# Patient Record
Sex: FEMALE | Race: BLACK OR AFRICAN AMERICAN | Employment: OTHER
[De-identification: names, ages, dates, MRNs, and addresses within clinical notes are randomized per-mention and may not be internally consistent; named-entity substitution may affect disease eponyms.]

---

## 2017-03-07 ENCOUNTER — OFFICE VISIT (OUTPATIENT)
Dept: FAMILY MEDICINE CLINIC | Facility: CLINIC | Age: 62
End: 2017-03-07

## 2017-03-07 VITALS
HEART RATE: 64 BPM | BODY MASS INDEX: 29.89 KG/M2 | DIASTOLIC BLOOD PRESSURE: 64 MMHG | WEIGHT: 148 LBS | SYSTOLIC BLOOD PRESSURE: 116 MMHG

## 2017-03-07 DIAGNOSIS — S46.002A: ICD-10-CM

## 2017-03-07 DIAGNOSIS — M75.52 DELTOID BURSITIS, LEFT: Primary | ICD-10-CM

## 2017-03-07 PROCEDURE — 99213 OFFICE O/P EST LOW 20 MIN: CPT | Performed by: FAMILY MEDICINE

## 2017-03-07 ASSESSMENT — ENCOUNTER SYMPTOMS
COUGH: 0
SINUS PRESSURE: 0
SHORTNESS OF BREATH: 0
BACK PAIN: 0
NAUSEA: 0
DIARRHEA: 0
VOMITING: 0
SORE THROAT: 0

## 2017-03-27 DIAGNOSIS — M75.52 DELTOID BURSITIS, LEFT: ICD-10-CM

## 2017-03-27 DIAGNOSIS — S46.002A: ICD-10-CM

## 2017-04-04 RX ORDER — ALENDRONATE SODIUM 70 MG/1
TABLET ORAL
Qty: 12 TABLET | Refills: 1 | Status: SHIPPED | OUTPATIENT
Start: 2017-04-04 | End: 2021-05-04 | Stop reason: SDUPTHER

## 2017-07-14 RX ORDER — SERTRALINE HYDROCHLORIDE 100 MG/1
TABLET, FILM COATED ORAL
Qty: 90 TABLET | Refills: 2 | Status: SHIPPED | OUTPATIENT
Start: 2017-07-14 | End: 2019-05-06 | Stop reason: SDUPTHER

## 2017-11-03 ENCOUNTER — OFFICE VISIT (OUTPATIENT)
Dept: FAMILY MEDICINE CLINIC | Age: 62
End: 2017-11-03
Payer: COMMERCIAL

## 2017-11-03 VITALS
HEART RATE: 98 BPM | SYSTOLIC BLOOD PRESSURE: 120 MMHG | DIASTOLIC BLOOD PRESSURE: 70 MMHG | WEIGHT: 151 LBS | HEIGHT: 59 IN | BODY MASS INDEX: 30.44 KG/M2

## 2017-11-03 DIAGNOSIS — R07.9 CHEST PAIN, UNSPECIFIED TYPE: Primary | ICD-10-CM

## 2017-11-03 PROCEDURE — 93000 ELECTROCARDIOGRAM COMPLETE: CPT | Performed by: FAMILY MEDICINE

## 2017-11-03 PROCEDURE — 99213 OFFICE O/P EST LOW 20 MIN: CPT | Performed by: FAMILY MEDICINE

## 2017-11-03 ASSESSMENT — ENCOUNTER SYMPTOMS
SORE THROAT: 0
NAUSEA: 0
DIARRHEA: 0
COUGH: 0
SHORTNESS OF BREATH: 0
BACK PAIN: 0
VOMITING: 0
SINUS PRESSURE: 0

## 2017-11-03 NOTE — PROGRESS NOTES
Ara Harry is a 65 y.o. female who presents today for her medical conditions/complaints as noted below. Ara Harry is c/o of Chest Pain (follow up)  Follow up on episode of chest pain yesterday while at work she went home still had it called Dr on call who suggested heating pad and aleve and this led to complete resolution of the pain and awoke this am pain free. HPI:     Visit Information    Have you changed or started any medications since your last visit including any over-the-counter medicines, vitamins, or herbal medicines? no   Are you having any side effects from any of your medications? -  no  Have you stopped taking any of your medications? Is so, why? -  no    Have you seen any other physician or provider since your last visit? No  Have you had any other diagnostic tests since your last visit? No  Have you been seen in the emergency room and/or had an admission to a hospital since we last saw you? No  Have you had your routine dental cleaning in the past 6 months? yes -     Have you activated your [employer] account? If not, what are your barriers?  No:      Patient Care Team:  Mike Ly MD as PCP - General (Family Medicine)    Medical History Review  Past Medical, Family, and Social History reviewed and  contribute to the patient presenting condition    Health Maintenance   Topic Date Due    HIV screen  01/11/1971    DTaP/Tdap/Td vaccine (1 - Tdap) 01/11/1975    Zostavax vaccine  01/11/2016    Flu vaccine (1) 09/01/2017    Breast cancer screen  09/24/2017    Cervical cancer screen  09/02/2018    Lipid screen  09/06/2019    Colon cancer screen colonoscopy  07/20/2026    Hepatitis C screen  Completed       Past Medical History:   Diagnosis Date    Fracture 1996    Thyroid disease     Dr. Emily Armstrong      Past Surgical History:   Procedure Laterality Date    ANKLE SURGERY      APPENDECTOMY      HYSTERECTOMY  1986     Family History   Problem Relation Age of Onset    Breast

## 2017-12-06 ENCOUNTER — OFFICE VISIT (OUTPATIENT)
Dept: FAMILY MEDICINE CLINIC | Age: 62
End: 2017-12-06
Payer: COMMERCIAL

## 2017-12-06 VITALS
DIASTOLIC BLOOD PRESSURE: 64 MMHG | HEIGHT: 60 IN | SYSTOLIC BLOOD PRESSURE: 120 MMHG | BODY MASS INDEX: 29.84 KG/M2 | HEART RATE: 81 BPM | WEIGHT: 152 LBS

## 2017-12-06 DIAGNOSIS — Z00.00 PHYSICAL EXAM, ANNUAL: ICD-10-CM

## 2017-12-06 DIAGNOSIS — E03.9 HYPOTHYROIDISM, UNSPECIFIED TYPE: Primary | ICD-10-CM

## 2017-12-06 PROCEDURE — 99396 PREV VISIT EST AGE 40-64: CPT | Performed by: FAMILY MEDICINE

## 2017-12-06 ASSESSMENT — ENCOUNTER SYMPTOMS
SINUS PRESSURE: 0
COUGH: 0
NAUSEA: 0
SORE THROAT: 0
VOMITING: 0
SHORTNESS OF BREATH: 0
DIARRHEA: 0
BACK PAIN: 0

## 2017-12-06 ASSESSMENT — PATIENT HEALTH QUESTIONNAIRE - PHQ9
1. LITTLE INTEREST OR PLEASURE IN DOING THINGS: 0
SUM OF ALL RESPONSES TO PHQ9 QUESTIONS 1 & 2: 0
2. FEELING DOWN, DEPRESSED OR HOPELESS: 0
SUM OF ALL RESPONSES TO PHQ QUESTIONS 1-9: 0

## 2017-12-14 DIAGNOSIS — Z00.00 PHYSICAL EXAM, ANNUAL: ICD-10-CM

## 2017-12-14 DIAGNOSIS — E03.9 HYPOTHYROIDISM, UNSPECIFIED TYPE: ICD-10-CM

## 2017-12-14 LAB
ALBUMIN SERPL-MCNC: 3.9 G/DL
ALP BLD-CCNC: 46 U/L
ALT SERPL-CCNC: 9 U/L
ANION GAP SERPL CALCULATED.3IONS-SCNC: 8 MMOL/L
AST SERPL-CCNC: 13 U/L
BASOPHILS ABSOLUTE: 0 /ΜL
BASOPHILS RELATIVE PERCENT: 0.9 %
BILIRUB SERPL-MCNC: 0.7 MG/DL (ref 0.1–1.4)
BUN BLDV-MCNC: 11 MG/DL
CALCIUM SERPL-MCNC: 9.3 MG/DL
CHLORIDE BLD-SCNC: 111 MMOL/L
CHOLESTEROL, TOTAL: 186 MG/DL
CHOLESTEROL/HDL RATIO: NORMAL
CO2: 24 MMOL/L
CREAT SERPL-MCNC: 0.79 MG/DL
EOSINOPHILS ABSOLUTE: 0.1 /ΜL
EOSINOPHILS RELATIVE PERCENT: 1.2 %
GFR CALCULATED: NORMAL
GLUCOSE BLD-MCNC: 92 MG/DL
HCT VFR BLD CALC: 40.6 % (ref 36–46)
HDLC SERPL-MCNC: 64 MG/DL (ref 35–70)
HEMOGLOBIN: 13.1 G/DL (ref 12–16)
LDL CHOLESTEROL CALCULATED: 108 MG/DL (ref 0–160)
LYMPHOCYTES ABSOLUTE: 2.6 /ΜL
LYMPHOCYTES RELATIVE PERCENT: 53.6 %
MCH RBC QN AUTO: 28.4 PG
MCHC RBC AUTO-ENTMCNC: 32.3 G/DL
MCV RBC AUTO: 88 FL
MONOCYTES ABSOLUTE: 0.3 /ΜL
MONOCYTES RELATIVE PERCENT: 6.5 %
NEUTROPHILS ABSOLUTE: 1.8 /ΜL
NEUTROPHILS RELATIVE PERCENT: 37.8 %
PDW BLD-RTO: 13.8 %
PLATELET # BLD: 211 K/ΜL
PMV BLD AUTO: 9 FL
POTASSIUM SERPL-SCNC: 4.5 MMOL/L
RBC # BLD: 4.6 10^6/ΜL
SODIUM BLD-SCNC: 143 MMOL/L
T4 TOTAL: 0.9
TOTAL PROTEIN: 6.8
TRIGL SERPL-MCNC: 72 MG/DL
TSH SERPL DL<=0.05 MIU/L-ACNC: 0.19 UIU/ML
VLDLC SERPL CALC-MCNC: NORMAL MG/DL
WBC # BLD: 4.8 10^3/ML

## 2018-05-18 ENCOUNTER — TELEPHONE (OUTPATIENT)
Dept: FAMILY MEDICINE CLINIC | Age: 63
End: 2018-05-18

## 2018-05-18 ENCOUNTER — OFFICE VISIT (OUTPATIENT)
Dept: FAMILY MEDICINE CLINIC | Age: 63
End: 2018-05-18
Payer: COMMERCIAL

## 2018-05-18 VITALS
SYSTOLIC BLOOD PRESSURE: 122 MMHG | DIASTOLIC BLOOD PRESSURE: 68 MMHG | WEIGHT: 149 LBS | HEART RATE: 80 BPM | BODY MASS INDEX: 29.1 KG/M2

## 2018-05-18 DIAGNOSIS — Z20.5 EXPOSURE TO HEPATITIS A: Primary | ICD-10-CM

## 2018-05-18 PROCEDURE — 99213 OFFICE O/P EST LOW 20 MIN: CPT | Performed by: FAMILY MEDICINE

## 2018-05-18 PROCEDURE — 90471 IMMUNIZATION ADMIN: CPT | Performed by: FAMILY MEDICINE

## 2018-05-18 PROCEDURE — 90632 HEPA VACCINE ADULT IM: CPT | Performed by: FAMILY MEDICINE

## 2018-05-18 ASSESSMENT — ENCOUNTER SYMPTOMS
COUGH: 0
SINUS PRESSURE: 0
VOMITING: 0
SORE THROAT: 0
SHORTNESS OF BREATH: 0
DIARRHEA: 0
NAUSEA: 0
BACK PAIN: 0

## 2018-10-12 RX ORDER — SERTRALINE HYDROCHLORIDE 100 MG/1
100 TABLET, FILM COATED ORAL DAILY
Qty: 90 TABLET | Refills: 3 | Status: SHIPPED | OUTPATIENT
Start: 2018-10-12 | End: 2019-05-06 | Stop reason: SDUPTHER

## 2018-10-12 NOTE — TELEPHONE ENCOUNTER
Health Maintenance   Topic Date Due    HIV screen  01/11/1971    DTaP/Tdap/Td vaccine (1 - Tdap) 01/11/1975    Shingles Vaccine (1 of 2 - 2 Dose Series) 01/11/2006    Breast cancer screen  09/24/2017    Flu vaccine (1) 09/01/2018    Cervical cancer screen  09/02/2018    TSH testing  12/09/2018    Lipid screen  12/09/2022    Colon cancer screen colonoscopy  07/20/2026    Hepatitis C screen  Completed       No results found for: LABA1C          ( goal A1C is < 7)   No results found for: LABMICR  LDL Cholesterol (mg/dL)   Date Value   09/06/2014 105     LDL Calculated (mg/dL)   Date Value   12/09/2017 108       (goal LDL is <100)   AST (U/L)   Date Value   12/09/2017 13     ALT (U/L)   Date Value   12/09/2017 9     BUN (mg/dL)   Date Value   12/09/2017 11     BP Readings from Last 3 Encounters:   05/18/18 122/68   12/06/17 120/64   11/03/17 120/70          (goal 120/80)    All Future Testing planned in [facility]  Lab Frequency Next Occurrence       Next Visit Date:  No future appointments.          Patient Active Problem List:     Osteopenia     FH: breast cancer in first degree relative     Hx of thyroid nodule     Hypothyroidism     Electronic cigarette use     Vaginal dryness, menopausal     Urgency of urination

## 2019-02-22 DIAGNOSIS — Z12.39 BREAST CANCER SCREENING: ICD-10-CM

## 2019-02-22 DIAGNOSIS — Z12.39 BREAST CANCER SCREENING: Primary | ICD-10-CM

## 2019-05-06 RX ORDER — SERTRALINE HYDROCHLORIDE 100 MG/1
TABLET, FILM COATED ORAL
Qty: 90 TABLET | Refills: 3 | Status: SHIPPED | OUTPATIENT
Start: 2019-05-06 | End: 2019-05-10 | Stop reason: SDUPTHER

## 2019-05-06 RX ORDER — SERTRALINE HYDROCHLORIDE 100 MG/1
100 TABLET, FILM COATED ORAL DAILY
Qty: 90 TABLET | Refills: 3 | Status: SHIPPED | OUTPATIENT
Start: 2019-05-06 | End: 2020-05-18

## 2019-05-06 NOTE — TELEPHONE ENCOUNTER
Last visit:   Last Med refill:   Does patient have enough medication for 72 hours:     Next Visit Date:  No future appointments.     Health Maintenance   Topic Date Due    HIV screen  01/11/1971    DTaP/Tdap/Td vaccine (1 - Tdap) 01/11/1975    Shingles Vaccine (1 of 2) 01/11/2006    Cervical cancer screen  09/02/2018    TSH testing  12/09/2018    Breast cancer screen  11/08/2019    Lipid screen  12/09/2022    Colon cancer screen colonoscopy  07/20/2026    Flu vaccine  Completed    Hepatitis C screen  Completed    Pneumococcal 0-64 years Vaccine  Aged Out       No results found for: LABA1C          ( goal A1C is < 7)   No results found for: LABMICR  LDL Cholesterol (mg/dL)   Date Value   09/06/2014 105   10/24/2013 123 (H)     LDL Calculated (mg/dL)   Date Value   12/09/2017 108       (goal LDL is <100)   AST (U/L)   Date Value   12/09/2017 13     ALT (U/L)   Date Value   12/09/2017 9     BUN (mg/dL)   Date Value   12/09/2017 11     BP Readings from Last 3 Encounters:   05/18/18 122/68   12/06/17 120/64   11/03/17 120/70          (goal 120/80)    All Future Testing planned in [facility]  Lab Frequency Next Occurrence               Patient Active Problem List:     Osteopenia     FH: breast cancer in first degree relative     Hx of thyroid nodule     Hypothyroidism     Electronic cigarette use     Vaginal dryness, menopausal     Urgency of urination

## 2019-05-10 RX ORDER — SERTRALINE HYDROCHLORIDE 100 MG/1
TABLET, FILM COATED ORAL
Qty: 90 TABLET | Refills: 3 | Status: SHIPPED | OUTPATIENT
Start: 2019-05-10 | End: 2019-09-09 | Stop reason: SDUPTHER

## 2019-09-09 ENCOUNTER — OFFICE VISIT (OUTPATIENT)
Dept: FAMILY MEDICINE CLINIC | Age: 64
End: 2019-09-09
Payer: COMMERCIAL

## 2019-09-09 VITALS
SYSTOLIC BLOOD PRESSURE: 126 MMHG | TEMPERATURE: 94.4 F | OXYGEN SATURATION: 100 % | WEIGHT: 170.6 LBS | BODY MASS INDEX: 33.32 KG/M2 | HEART RATE: 91 BPM | DIASTOLIC BLOOD PRESSURE: 82 MMHG

## 2019-09-09 DIAGNOSIS — E03.9 HYPOTHYROIDISM, UNSPECIFIED TYPE: ICD-10-CM

## 2019-09-09 DIAGNOSIS — J40 BRONCHITIS: ICD-10-CM

## 2019-09-09 DIAGNOSIS — G89.29 CHRONIC PAIN OF RIGHT KNEE: Primary | ICD-10-CM

## 2019-09-09 DIAGNOSIS — M25.561 CHRONIC PAIN OF RIGHT KNEE: Primary | ICD-10-CM

## 2019-09-09 PROCEDURE — 1036F TOBACCO NON-USER: CPT | Performed by: FAMILY MEDICINE

## 2019-09-09 PROCEDURE — G8417 CALC BMI ABV UP PARAM F/U: HCPCS | Performed by: FAMILY MEDICINE

## 2019-09-09 PROCEDURE — 3017F COLORECTAL CA SCREEN DOC REV: CPT | Performed by: FAMILY MEDICINE

## 2019-09-09 PROCEDURE — G8427 DOCREV CUR MEDS BY ELIG CLIN: HCPCS | Performed by: FAMILY MEDICINE

## 2019-09-09 PROCEDURE — 99213 OFFICE O/P EST LOW 20 MIN: CPT | Performed by: FAMILY MEDICINE

## 2019-09-09 RX ORDER — AZITHROMYCIN 250 MG/1
TABLET, FILM COATED ORAL
Qty: 1 PACKET | Refills: 0 | Status: SHIPPED | OUTPATIENT
Start: 2019-09-09 | End: 2019-10-30 | Stop reason: ALTCHOICE

## 2019-09-09 ASSESSMENT — PATIENT HEALTH QUESTIONNAIRE - PHQ9
1. LITTLE INTEREST OR PLEASURE IN DOING THINGS: 0
SUM OF ALL RESPONSES TO PHQ QUESTIONS 1-9: 0
SUM OF ALL RESPONSES TO PHQ9 QUESTIONS 1 & 2: 0
SUM OF ALL RESPONSES TO PHQ QUESTIONS 1-9: 0
2. FEELING DOWN, DEPRESSED OR HOPELESS: 0

## 2019-09-09 ASSESSMENT — ENCOUNTER SYMPTOMS
VOMITING: 0
NAUSEA: 0
BACK PAIN: 0
SORE THROAT: 0
COUGH: 1
DIARRHEA: 0
SINUS PRESSURE: 0
SHORTNESS OF BREATH: 0

## 2019-09-09 NOTE — PROGRESS NOTES
Mohan Kerr is a 62 y.o. female who presents todayfor her medical conditions/complaints as noted below. Mohan Kerr is here today c/Casimiro Swelling (right for the past few weeks . no injury); Leg Pain (right); Weight Gain; and Cough (X 1 week)      :         HPI    Persistent productive cough. Intermittent right knee pain that at times gives out on her. Other times when she kneels on her right knee very painful shooting pain occurs. Feels at times it swells and possibly associated with some calf and ankle swelling also. Past Medical History:   Diagnosis Date    Fracture     Thyroid disease     Dr. Otoole Degree      Past Surgical History:   Procedure Laterality Date    ANKLE SURGERY      APPENDECTOMY      HYSTERECTOMY       Family History   Problem Relation Age of Onset    Breast Cancer Mother     Heart Attack Mother     Heart Attack Sister     Other Brother         HIV     Social History     Tobacco Use    Smoking status: Former Smoker     Types: Cigarettes     Last attempt to quit: 3/13/2013     Years since quittin.4    Smokeless tobacco: Never Used    Tobacco comment: uses electric cig   Substance Use Topics    Alcohol use: No      Current Outpatient Medications   Medication Sig Dispense Refill    azithromycin (ZITHROMAX Z-JESSI) 250 MG tablet Take 2 pills on day one then one pill daily til gone. 1 packet 0    sertraline (ZOLOFT) 100 MG tablet Take 1 tablet by mouth daily 90 tablet 3    alendronate (FOSAMAX) 70 MG tablet TAKE 1 TABLET BY MOUTH EVERY 7 DAYS 12 tablet 1    levothyroxine (SYNTHROID) 100 MCG tablet Take 100 mcg by mouth Daily. No current facility-administered medications for this visit. No Known Allergies      Subjective:   Review of Systems   Constitutional: Negative for chills, diaphoresis and fever. HENT: Positive for congestion. Negative for sinus pressure and sore throat. Eyes: Negative for visual disturbance. Respiratory: Positive for cough.

## 2019-09-18 DIAGNOSIS — G89.29 CHRONIC PAIN OF RIGHT KNEE: ICD-10-CM

## 2019-09-18 DIAGNOSIS — M25.561 CHRONIC PAIN OF RIGHT KNEE: ICD-10-CM

## 2019-09-25 ENCOUNTER — TELEPHONE (OUTPATIENT)
Dept: FAMILY MEDICINE CLINIC | Age: 64
End: 2019-09-25

## 2019-09-26 DIAGNOSIS — G89.29 CHRONIC PAIN OF RIGHT KNEE: Primary | ICD-10-CM

## 2019-09-26 DIAGNOSIS — M25.561 CHRONIC PAIN OF RIGHT KNEE: Primary | ICD-10-CM

## 2019-10-17 ENCOUNTER — TELEPHONE (OUTPATIENT)
Dept: FAMILY MEDICINE CLINIC | Age: 64
End: 2019-10-17

## 2019-10-30 ENCOUNTER — OFFICE VISIT (OUTPATIENT)
Dept: FAMILY MEDICINE CLINIC | Age: 64
End: 2019-10-30
Payer: COMMERCIAL

## 2019-10-30 VITALS
DIASTOLIC BLOOD PRESSURE: 70 MMHG | HEART RATE: 88 BPM | HEIGHT: 59 IN | OXYGEN SATURATION: 97 % | SYSTOLIC BLOOD PRESSURE: 112 MMHG | WEIGHT: 170.6 LBS | BODY MASS INDEX: 34.39 KG/M2

## 2019-10-30 DIAGNOSIS — R07.89 CHEST WALL PAIN: Primary | ICD-10-CM

## 2019-10-30 DIAGNOSIS — R06.00 DYSPNEA, UNSPECIFIED TYPE: ICD-10-CM

## 2019-10-30 PROCEDURE — 3017F COLORECTAL CA SCREEN DOC REV: CPT | Performed by: FAMILY MEDICINE

## 2019-10-30 PROCEDURE — 99213 OFFICE O/P EST LOW 20 MIN: CPT | Performed by: FAMILY MEDICINE

## 2019-10-30 PROCEDURE — G8482 FLU IMMUNIZE ORDER/ADMIN: HCPCS | Performed by: FAMILY MEDICINE

## 2019-10-30 PROCEDURE — G8417 CALC BMI ABV UP PARAM F/U: HCPCS | Performed by: FAMILY MEDICINE

## 2019-10-30 PROCEDURE — 1036F TOBACCO NON-USER: CPT | Performed by: FAMILY MEDICINE

## 2019-10-30 PROCEDURE — G8427 DOCREV CUR MEDS BY ELIG CLIN: HCPCS | Performed by: FAMILY MEDICINE

## 2019-10-30 ASSESSMENT — ENCOUNTER SYMPTOMS
COUGH: 0
SORE THROAT: 0
VOMITING: 0
DIARRHEA: 0
NAUSEA: 0
SHORTNESS OF BREATH: 0
BACK PAIN: 0
SINUS PRESSURE: 0

## 2019-10-31 ENCOUNTER — HOSPITAL ENCOUNTER (OUTPATIENT)
Age: 64
Setting detail: SPECIMEN
Discharge: HOME | End: 2019-10-31
Payer: COMMERCIAL

## 2019-10-31 DIAGNOSIS — R07.89 CHEST WALL PAIN: ICD-10-CM

## 2019-10-31 DIAGNOSIS — R06.00 DYSPNEA, UNSPECIFIED TYPE: ICD-10-CM

## 2019-10-31 LAB
ABSOLUTE EOS #: 0.06 K/UL (ref 0–0.44)
ABSOLUTE IMMATURE GRANULOCYTE: <0.03 K/UL (ref 0–0.3)
ABSOLUTE LYMPH #: 3.47 K/UL (ref 1.1–3.7)
ABSOLUTE MONO #: 0.38 K/UL (ref 0.1–1.2)
ALBUMIN SERPL-MCNC: 4.2 G/DL (ref 3.5–5.2)
ALBUMIN/GLOBULIN RATIO: 1.2 (ref 1–2.5)
ALP BLD-CCNC: 55 U/L (ref 35–104)
ALT SERPL-CCNC: 11 U/L (ref 5–33)
ANION GAP SERPL CALCULATED.3IONS-SCNC: 12 MMOL/L (ref 9–17)
AST SERPL-CCNC: 16 U/L
BASOPHILS # BLD: 1 % (ref 0–2)
BASOPHILS ABSOLUTE: 0.04 K/UL (ref 0–0.2)
BILIRUB SERPL-MCNC: 0.54 MG/DL (ref 0.3–1.2)
BNP INTERPRETATION: NORMAL
BUN BLDV-MCNC: 13 MG/DL (ref 8–23)
BUN/CREAT BLD: ABNORMAL (ref 9–20)
CALCIUM SERPL-MCNC: 9.5 MG/DL (ref 8.6–10.4)
CHLORIDE BLD-SCNC: 109 MMOL/L (ref 98–107)
CHOLESTEROL/HDL RATIO: 3.2
CHOLESTEROL: 230 MG/DL
CO2: 23 MMOL/L (ref 20–31)
CREAT SERPL-MCNC: 0.76 MG/DL (ref 0.5–0.9)
D-DIMER QUANTITATIVE: 0.63 MG/L FEU
DIFFERENTIAL TYPE: ABNORMAL
EOSINOPHILS RELATIVE PERCENT: 1 % (ref 1–4)
GFR AFRICAN AMERICAN: >60 ML/MIN
GFR NON-AFRICAN AMERICAN: >60 ML/MIN
GFR SERPL CREATININE-BSD FRML MDRD: ABNORMAL ML/MIN/{1.73_M2}
GFR SERPL CREATININE-BSD FRML MDRD: ABNORMAL ML/MIN/{1.73_M2}
GLUCOSE BLD-MCNC: 100 MG/DL (ref 70–99)
HCT VFR BLD CALC: 45.7 % (ref 36.3–47.1)
HDLC SERPL-MCNC: 71 MG/DL
HEMOGLOBIN: 13.9 G/DL (ref 11.9–15.1)
IMMATURE GRANULOCYTES: 0 %
LDL CHOLESTEROL: 136 MG/DL (ref 0–130)
LYMPHOCYTES # BLD: 52 % (ref 24–43)
MCH RBC QN AUTO: 28.3 PG (ref 25.2–33.5)
MCHC RBC AUTO-ENTMCNC: 30.4 G/DL (ref 28.4–34.8)
MCV RBC AUTO: 92.9 FL (ref 82.6–102.9)
MONOCYTES # BLD: 6 % (ref 3–12)
NRBC AUTOMATED: 0 PER 100 WBC
PDW BLD-RTO: 13.8 % (ref 11.8–14.4)
PLATELET # BLD: 262 K/UL (ref 138–453)
PLATELET ESTIMATE: ABNORMAL
PMV BLD AUTO: 11 FL (ref 8.1–13.5)
POTASSIUM SERPL-SCNC: 4.5 MMOL/L (ref 3.7–5.3)
PRO-BNP: 51 PG/ML
RBC # BLD: 4.92 M/UL (ref 3.95–5.11)
RBC # BLD: ABNORMAL 10*6/UL
SEG NEUTROPHILS: 40 % (ref 36–65)
SEGMENTED NEUTROPHILS ABSOLUTE COUNT: 2.6 K/UL (ref 1.5–8.1)
SODIUM BLD-SCNC: 144 MMOL/L (ref 135–144)
TOTAL PROTEIN: 7.6 G/DL (ref 6.4–8.3)
TRIGL SERPL-MCNC: 115 MG/DL
VLDLC SERPL CALC-MCNC: ABNORMAL MG/DL (ref 1–30)
WBC # BLD: 6.6 K/UL (ref 3.5–11.3)
WBC # BLD: ABNORMAL 10*3/UL

## 2019-12-09 ENCOUNTER — TELEPHONE (OUTPATIENT)
Dept: FAMILY MEDICINE CLINIC | Age: 64
End: 2019-12-09

## 2020-05-18 RX ORDER — SERTRALINE HYDROCHLORIDE 100 MG/1
TABLET, FILM COATED ORAL
Qty: 90 TABLET | Refills: 3 | Status: SHIPPED | OUTPATIENT
Start: 2020-05-18 | End: 2021-01-06 | Stop reason: DRUGHIGH

## 2020-05-18 NOTE — TELEPHONE ENCOUNTER
Next Visit Date:  No future appointments.     Health Maintenance   Topic Date Due    HIV screen  01/11/1971    Shingles Vaccine (1 of 2) 01/11/2006    TSH testing  12/09/2018    Breast cancer screen  11/08/2019    Lipid screen  10/31/2024    Colon cancer screen colonoscopy  07/20/2026    DTaP/Tdap/Td vaccine (2 - Td) 03/05/2029    Flu vaccine  Completed    Hepatitis C screen  Completed    Hepatitis A vaccine  Aged Out    Hepatitis B vaccine  Aged Out    Hib vaccine  Aged Out    Meningococcal (ACWY) vaccine  Aged Out    Pneumococcal 0-64 years Vaccine  Aged Out       No results found for: LABA1C          ( goal A1C is < 7)   No results found for: LABMICR  LDL Cholesterol (mg/dL)   Date Value   10/31/2019 136 (H)   09/06/2014 105     LDL Calculated (mg/dL)   Date Value   12/09/2017 108       (goal LDL is <100)   AST (U/L)   Date Value   10/31/2019 16     ALT (U/L)   Date Value   10/31/2019 11     BUN (mg/dL)   Date Value   10/31/2019 13     BP Readings from Last 3 Encounters:   10/30/19 112/70   09/09/19 126/82   05/18/18 122/68          (goal 120/80)    All Future Testing planned in [facility]  Lab Frequency Next Occurrence   XR THORACIC SPINE (2 VIEWS) Once 10/30/2019   XR RIBS RIGHT INCLUDE CHEST (MIN 3 VIEWS) Once 10/30/2019               Patient Active Problem List:     Osteopenia     FH: breast cancer in first degree relative     Hx of thyroid nodule     Hypothyroidism     Electronic cigarette use     Vaginal dryness, menopausal     Urgency of urination

## 2020-10-05 ENCOUNTER — IMMUNIZATION (OUTPATIENT)
Dept: FAMILY MEDICINE CLINIC | Age: 65
End: 2020-10-05
Payer: COMMERCIAL

## 2020-10-05 PROCEDURE — 90471 IMMUNIZATION ADMIN: CPT | Performed by: FAMILY MEDICINE

## 2020-10-05 PROCEDURE — 90694 VACC AIIV4 NO PRSRV 0.5ML IM: CPT | Performed by: FAMILY MEDICINE

## 2020-12-28 ENCOUNTER — TELEPHONE (OUTPATIENT)
Dept: FAMILY MEDICINE CLINIC | Age: 65
End: 2020-12-28

## 2020-12-28 NOTE — TELEPHONE ENCOUNTER
Pt tested last Wednesday the 23rd but the symptoms actually began a week before that. Still need to reschedule?

## 2020-12-28 NOTE — TELEPHONE ENCOUNTER
Please inform her that she has an appointment on 1/6.  If she tested positive yesterday or today she will need to reschedule her appointment to a few days after her 1/6 appointment

## 2021-01-04 ENCOUNTER — TELEPHONE (OUTPATIENT)
Dept: FAMILY MEDICINE CLINIC | Age: 66
End: 2021-01-04

## 2021-01-06 ENCOUNTER — OFFICE VISIT (OUTPATIENT)
Dept: FAMILY MEDICINE CLINIC | Age: 66
End: 2021-01-06
Payer: MEDICARE

## 2021-01-06 ENCOUNTER — HOSPITAL ENCOUNTER (OUTPATIENT)
Age: 66
Setting detail: SPECIMEN
Discharge: HOME | End: 2021-01-06
Payer: MEDICARE

## 2021-01-06 VITALS
HEIGHT: 59 IN | WEIGHT: 162.2 LBS | TEMPERATURE: 96.7 F | OXYGEN SATURATION: 97 % | DIASTOLIC BLOOD PRESSURE: 80 MMHG | HEART RATE: 86 BPM | BODY MASS INDEX: 32.7 KG/M2 | SYSTOLIC BLOOD PRESSURE: 124 MMHG

## 2021-01-06 DIAGNOSIS — F41.9 ANXIETY AND DEPRESSION: ICD-10-CM

## 2021-01-06 DIAGNOSIS — Z76.89 ENCOUNTER TO ESTABLISH CARE: ICD-10-CM

## 2021-01-06 DIAGNOSIS — F32.A ANXIETY AND DEPRESSION: ICD-10-CM

## 2021-01-06 DIAGNOSIS — M85.80 OSTEOPENIA, UNSPECIFIED LOCATION: ICD-10-CM

## 2021-01-06 DIAGNOSIS — E03.9 HYPOTHYROIDISM, UNSPECIFIED TYPE: ICD-10-CM

## 2021-01-06 DIAGNOSIS — E03.9 HYPOTHYROIDISM, UNSPECIFIED TYPE: Primary | ICD-10-CM

## 2021-01-06 LAB
ABSOLUTE EOS #: 0.03 K/UL (ref 0–0.44)
ABSOLUTE IMMATURE GRANULOCYTE: <0.03 K/UL (ref 0–0.3)
ABSOLUTE LYMPH #: 3.5 K/UL (ref 1.1–3.7)
ABSOLUTE MONO #: 0.43 K/UL (ref 0.1–1.2)
ALBUMIN SERPL-MCNC: 4.5 G/DL (ref 3.5–5.2)
ALBUMIN/GLOBULIN RATIO: 1.5 (ref 1–2.5)
ALP BLD-CCNC: 54 U/L (ref 35–104)
ALT SERPL-CCNC: 10 U/L (ref 5–33)
ANION GAP SERPL CALCULATED.3IONS-SCNC: 14 MMOL/L (ref 9–17)
AST SERPL-CCNC: 16 U/L
BASOPHILS # BLD: 1 % (ref 0–2)
BASOPHILS ABSOLUTE: 0.04 K/UL (ref 0–0.2)
BILIRUB SERPL-MCNC: 0.61 MG/DL (ref 0.3–1.2)
BUN BLDV-MCNC: 10 MG/DL (ref 8–23)
BUN/CREAT BLD: NORMAL (ref 9–20)
CALCIUM SERPL-MCNC: 9.7 MG/DL (ref 8.6–10.4)
CHLORIDE BLD-SCNC: 106 MMOL/L (ref 98–107)
CHOLESTEROL, FASTING: 236 MG/DL
CHOLESTEROL/HDL RATIO: 3.5
CO2: 22 MMOL/L (ref 20–31)
CREAT SERPL-MCNC: 0.75 MG/DL (ref 0.5–0.9)
DIFFERENTIAL TYPE: ABNORMAL
EOSINOPHILS RELATIVE PERCENT: 1 % (ref 1–4)
GFR AFRICAN AMERICAN: >60 ML/MIN
GFR NON-AFRICAN AMERICAN: >60 ML/MIN
GFR SERPL CREATININE-BSD FRML MDRD: NORMAL ML/MIN/{1.73_M2}
GFR SERPL CREATININE-BSD FRML MDRD: NORMAL ML/MIN/{1.73_M2}
GLUCOSE BLD-MCNC: 91 MG/DL (ref 70–99)
HCT VFR BLD CALC: 42.7 % (ref 36.3–47.1)
HDLC SERPL-MCNC: 68 MG/DL
HEMOGLOBIN: 13.2 G/DL (ref 11.9–15.1)
IMMATURE GRANULOCYTES: 0 %
LDL CHOLESTEROL: 149 MG/DL (ref 0–130)
LYMPHOCYTES # BLD: 52 % (ref 24–43)
MCH RBC QN AUTO: 28.2 PG (ref 25.2–33.5)
MCHC RBC AUTO-ENTMCNC: 30.9 G/DL (ref 28.4–34.8)
MCV RBC AUTO: 91.2 FL (ref 82.6–102.9)
MONOCYTES # BLD: 7 % (ref 3–12)
NRBC AUTOMATED: 0 PER 100 WBC
PDW BLD-RTO: 13.7 % (ref 11.8–14.4)
PLATELET # BLD: 282 K/UL (ref 138–453)
PLATELET ESTIMATE: ABNORMAL
PMV BLD AUTO: 11.3 FL (ref 8.1–13.5)
POTASSIUM SERPL-SCNC: 4.6 MMOL/L (ref 3.7–5.3)
RBC # BLD: 4.68 M/UL (ref 3.95–5.11)
RBC # BLD: ABNORMAL 10*6/UL
SEG NEUTROPHILS: 39 % (ref 36–65)
SEGMENTED NEUTROPHILS ABSOLUTE COUNT: 2.62 K/UL (ref 1.5–8.1)
SODIUM BLD-SCNC: 142 MMOL/L (ref 135–144)
THYROXINE, FREE: 1.53 NG/DL (ref 0.93–1.7)
TOTAL PROTEIN: 7.5 G/DL (ref 6.4–8.3)
TRIGLYCERIDE, FASTING: 94 MG/DL
TSH SERPL DL<=0.05 MIU/L-ACNC: 0.14 MIU/L (ref 0.3–5)
VLDLC SERPL CALC-MCNC: ABNORMAL MG/DL (ref 1–30)
WBC # BLD: 6.6 K/UL (ref 3.5–11.3)
WBC # BLD: ABNORMAL 10*3/UL

## 2021-01-06 PROCEDURE — G8484 FLU IMMUNIZE NO ADMIN: HCPCS | Performed by: NURSE PRACTITIONER

## 2021-01-06 PROCEDURE — G8427 DOCREV CUR MEDS BY ELIG CLIN: HCPCS | Performed by: NURSE PRACTITIONER

## 2021-01-06 PROCEDURE — 1036F TOBACCO NON-USER: CPT | Performed by: NURSE PRACTITIONER

## 2021-01-06 PROCEDURE — 99214 OFFICE O/P EST MOD 30 MIN: CPT | Performed by: NURSE PRACTITIONER

## 2021-01-06 PROCEDURE — 3017F COLORECTAL CA SCREEN DOC REV: CPT | Performed by: NURSE PRACTITIONER

## 2021-01-06 PROCEDURE — G8417 CALC BMI ABV UP PARAM F/U: HCPCS | Performed by: NURSE PRACTITIONER

## 2021-01-06 SDOH — ECONOMIC STABILITY: INCOME INSECURITY: HOW HARD IS IT FOR YOU TO PAY FOR THE VERY BASICS LIKE FOOD, HOUSING, MEDICAL CARE, AND HEATING?: NOT HARD AT ALL

## 2021-01-06 SDOH — ECONOMIC STABILITY: TRANSPORTATION INSECURITY
IN THE PAST 12 MONTHS, HAS LACK OF TRANSPORTATION KEPT YOU FROM MEETINGS, WORK, OR FROM GETTING THINGS NEEDED FOR DAILY LIVING?: NO

## 2021-01-06 SDOH — ECONOMIC STABILITY: FOOD INSECURITY: WITHIN THE PAST 12 MONTHS, YOU WORRIED THAT YOUR FOOD WOULD RUN OUT BEFORE YOU GOT MONEY TO BUY MORE.: NEVER TRUE

## 2021-01-06 ASSESSMENT — PATIENT HEALTH QUESTIONNAIRE - PHQ9
SUM OF ALL RESPONSES TO PHQ QUESTIONS 1-9: 0
SUM OF ALL RESPONSES TO PHQ QUESTIONS 1-9: 0
1. LITTLE INTEREST OR PLEASURE IN DOING THINGS: 0
SUM OF ALL RESPONSES TO PHQ QUESTIONS 1-9: 0

## 2021-01-06 NOTE — PROGRESS NOTES
[place] [address]  [id number] [facility] [facility]  [address]  [phone]  [phone]    Paolo Craig is a 60 y.o. female who is here with c/o of:    Chief Complaint: New Patient and Establish Care      Patient Accompanied by: N/A    HPI - Paolo Craig is here today with c/o:    Patient here to establish care. Previous PCP: Dr Miesha Leyva  : No  Children: No  Employed: Retired  Exercise: Yes  Diet: Eats a balanced diet  Smoker: No  Alcohol: No  Sleep: Averages 7-8 hours broken up    Chronic Conditions:    Anxiety and depression- Complaint with medication. Denies SI or HI. Hypothyroid- Follows with endo. Last thyroid check last year. She does have enlarged thyroid that they regularly monitor. Osteopenia- Compliant with Fosamax. Dexa utd. Specialists:    Endocrinology- Dr Yudy Moreland [facility]    Health Concerns:    She would like to try weaning off her Zoloft. She started with Zoloft back in  after her mother . She tried to wean off her self and developed brain zaps but was doing it cold turkey.       Health Maintenance Due   Topic Date Due    HIV screen  1971    Shingles Vaccine (1 of 2) 2006    TSH testing  2018    Breast cancer screen  2019        Patient Active Problem List:     Osteopenia     FH: breast cancer in first degree relative     Hx of thyroid nodule     Hypothyroidism     Electronic cigarette use     Vaginal dryness, menopausal     Urgency of urination     Past Medical History:   Diagnosis Date    Fracture     Thyroid disease     Dr. Yudy Moreland      Past Surgical History:   Procedure Laterality Date    ANKLE SURGERY      APPENDECTOMY      HYSTERECTOMY      HYSTERECTOMY, TOTAL ABDOMINAL       Family History   Problem Relation Age of Onset    Breast Cancer Mother     Heart Attack Mother     Heart Attack Sister     Other Brother         HIV     Social History     Tobacco Use    Smoking status: Former Smoker     Types: Cigarettes     Quit date: 3/13/2013     Years since quittin.8    Smokeless tobacco: Never Used    Tobacco comment: uses electric cig   Substance Use Topics    Alcohol use: No     ALLERGIES:  No Known Allergies       Subjective   Review of Systems   · Constitutional:  Negative for activity change, appetite change,unexpected weight change, chills, fever, and fatigue. · HENT: Negative for ear pain, sore throat,  Rhinorrhea, sinus pain, sinus pressure, congestion. · Eyes:  Negative for pain and discharge. · Respiratory:  Negative for chest tightness, shortness of breath, wheezing, and cough. · Cardiovascular:  Negative for chest pain, palpitations and leg swelling. · Gastrointestinal: Negative for abdominal pain, blood in stool, constipation,diarrhea, nausea and vomiting. · Endocrine: Negative for cold intolerance, heat intolerance, polydipsia, polyphagia and polyuria. · Genitourinary: Negative for difficulty urinating, dysuria, flank pain, frequency, hematuria and urgency. · Musculoskeletal: Negative for arthralgias, back pain, joint swelling, myalgias, neck pain and neck stiffness. · Skin: Negative for rash and wound. · Allergic/Immunologic: Negative for environmental allergies and food allergies. · Neurological:  Negative for dizziness, light-headedness, numbness and headaches. · Hematological:  Negative for adenopathy. Does not bruise/bleed easily. · Psychiatric/Behavioral: Negative for self-injury, sleep disturbance and suicidal ideas. Objective   Physical Exam   PHYSICAL EXAM:   · Constitutional: Samantha Black is oriented to person, place, and time. Vital signs are normal. Appears well-developed and well-nourished. She is obese  · HEENT:   · Head: Normocephalic and atraumatic. Nose: Nares normal. Septum midline. No drainage or sinus tenderness. Mucosa pink and moist  · Mouth/Throat: Oropharynx- No erythema, no exudate.   Uvula midline, no erythema, no edema.  Mucous membranes are pink and moist.   · Eyes:PERRL, EOMI, Conjunctiva normal, No discharge. · Neck: Full passive range of motion. Non-tender on palpation. Neck supple. Thyromegaly present. Trachea normal.  · Cardiovascular: Normal rate, regular rhythm, S1, S2, no murmur, no gallop, no friction rub, intact distal pulses. · Pulmonary/Chest: Breath sounds are clear throughout, No respiratory distress, No wheezing, No chest tenderness. Effort normal  · Abdominal: Soft. Normal appearance, bowel sounds are present and normoactive. There is no hepatosplenomegaly. There is no tenderness. There is no CVA tenderness. · Musculoskeletal: Extremities appear regular and symmetric. No evident masses, lesions, foreign bodies, or other abnormalities. No edema. No tenderness on palpation. Joints are stable. Full ROM, strength and tone are within normal limits. · Lymphadenopathy: No lymphadenopathy noted. · Neurological: Alert and oriented to person, place, and time. Normal motor function, Normal sensory function, No focal deficits noted. He has normal strength. · Skin: Skin is warm, dry and intact. No obvious lesions on exposed skin  · Psychiatric: Normal mood and affect. Speech is normal and behavior is normal.     Nursing note and vitals reviewed. Blood pressure 124/80, pulse 86, temperature 96.7 °F (35.9 °C), temperature source Temporal, height 4' 11\" (1.499 m), weight 162 lb 3.2 oz (73.6 kg), SpO2 97 %, not currently breastfeeding. Body mass index is 32.76 kg/m².     Wt Readings from Last 3 Encounters:   01/06/21 162 lb 3.2 oz (73.6 kg)   10/30/19 170 lb 9.6 oz (77.4 kg)   09/09/19 170 lb 9.6 oz (77.4 kg)     BP Readings from Last 3 Encounters:   01/06/21 124/80   10/30/19 112/70   09/09/19 126/82       Hospital Outpatient Visit on 10/31/2019   Component Date Value Ref Range Status    D-Dimer, Quant 10/31/2019 0.63  mg/L FEU Final    Comment:        When combined with a low clinical probability, a D dimer value of <0.50 mg/L FEU is   considered negative for DVT and PE (negative predictive value of 98%, sensitivity of 97%). If this test is not being used to help rule out DVT and PE, then the following reference   range should be utilized: 0.00 - 1.02 mg/L FEU. The [employer] D-Dimer assay is intended for use as an aid in the diagnosis of venous   thromboembolism (DVT and PE) and the results should be interpreted in conjunction with the   patient's medical history, clinical presentation, and other findings. Elevated levels of D-dimer activity can be seen in any state of coagulation activation and   is not recommended in patients with therapeutic dose anticoagulant therapy for >24 hours,   fibrinolytic therapy within the previous 7 days, trauma or surgery within the previous 4   weeks,   disseminated malignancies, aortic aneurysm, sepsis, severe infections, pneumonia, severe   skin infections, liver cirrhosis, advance                           d age, coronary disease, diabetes, and pregnancy. A very low percentage of patients with DVT may yield D-dimer results below the cutoff of   0.5 mg/L FEU. This is known to be more prevalent in patients with distal DVT.  Pro-BNP 10/31/2019 51  <300 pg/mL Final     Pro-BNP results cannot be compared to BNP results.  BNP Interpretation 10/31/2019 Pro-BNP Reference Range:   Final    Comment:       Rule Out:    <300        Grey Zone:   Age <50     300-450   Age 54-65   300-900   Age >75     300-1800  Usually represents mild to moderate HF but other cardiopulmonary causes cannot be ruled out.         Rule In:   Age <50     >65   Age 54-65   >900   Age >76    >5      Cholesterol 10/31/2019 230* <200 mg/dL Final    Comment:    Cholesterol Guidelines:      <200  Desirable   200-240  Borderline      >240  Undesirable         HDL 10/31/2019 71  >40 mg/dL Final    Comment:    HDL Guidelines:    <40     Undesirable   40-59    Borderline    >59 Desirable         LDL Cholesterol 10/31/2019 136* 0 - 130 mg/dL Final    Comment:    LDL Guidelines:     <100    Desirable   100-129   Near to/above Desirable   130-159   Borderline      >159   Undesirable     Direct (measured) LDL and calculated LDL are not interchangeable tests.  Chol/HDL Ratio 10/31/2019 3.2  <5 Final            Triglycerides 10/31/2019 115  <150 mg/dL Final    Comment:    Triglyceride Guidelines:     <150   Desirable   150-199  Borderline   200-499  High     >499   Very high   Based on AHA Guidelines for fasting triglyceride, October 2012.  VLDL 10/31/2019 NOT REPORTED  1 - 30 mg/dL Final    Glucose 10/31/2019 100* 70 - 99 mg/dL Final    BUN 10/31/2019 13  8 - 23 mg/dL Final    CREATININE 10/31/2019 0.76  0.50 - 0.90 mg/dL Final    Bun/Cre Ratio 10/31/2019 NOT REPORTED  9 - 20 Final    Calcium 10/31/2019 9.5  8.6 - 10.4 mg/dL Final    Sodium 10/31/2019 144  135 - 144 mmol/L Final    Potassium 10/31/2019 4.5  3.7 - 5.3 mmol/L Final    Chloride 10/31/2019 109* 98 - 107 mmol/L Final    CO2 10/31/2019 23  20 - 31 mmol/L Final    Anion Gap 10/31/2019 12  9 - 17 mmol/L Final    Alkaline Phosphatase 10/31/2019 55  35 - 104 U/L Final    ALT 10/31/2019 11  5 - 33 U/L Final    AST 10/31/2019 16  <32 U/L Final    Total Bilirubin 10/31/2019 0.54  0.3 - 1.2 mg/dL Final    Total Protein 10/31/2019 7.6  6.4 - 8.3 g/dL Final    Alb 10/31/2019 4.2  3.5 - 5.2 g/dL Final    Albumin/Globulin Ratio 10/31/2019 1.2  1.0 - 2.5 Final    GFR Non- 10/31/2019 >60  >60 mL/min Final    GFR  10/31/2019 >60  >60 mL/min Final    GFR Comment 10/31/2019        Final    Comment: Average GFR for 61-76 years old:   80 mL/min/1.73sq m  Chronic Kidney Disease:   <60 mL/min/1.73sq m  Kidney failure:   <15 mL/min/1.73sq m              eGFR calculated using average adult body mass.  Additional eGFR calculator available at: Comprehensive Metabolic Panel; Future  - TSH with Reflex; Future  - Lipid, Fasting; Future    2. Anxiety and depression    - Will decrease the dose to 50 mg until next appointment and see how she tolerates it. - sertraline (ZOLOFT) 50 MG tablet; Take 1 tablet by mouth daily  Dispense: 90 tablet; Refill: 0  - CBC Auto Differential; Future  - Comprehensive Metabolic Panel; Future  - TSH with Reflex; Future  - Lipid, Fasting; Future    3. Osteopenia, unspecified location    - Continue Fosamax 70 mg  - CBC Auto Differential; Future  - Comprehensive Metabolic Panel; Future  - TSH with Reflex; Future  - Lipid, Fasting; Future    4. Encounter to establish care        Return in about 6 months (around 7/6/2021) for hypothyroid/depression/anxiety. 1.  Clemencia Nogueira received counseling on the following healthy behaviors: nutrition, exercise and medication adherence  2. Patient given educational materials - see patient instructions  3. Was a self-tracking handout given in paper form or via [employer]? No  If yes, see orders or list here. 4.  Discussed use, benefit, and side effects of prescribed medications. Barriers to medication compliance addressed. All patient questions answered. Pt voiced understanding. 5.  Reviewed prior labs, imaging, consultation, follow up, and health maintenance  6. Continue current medications, diet and exercise. 7. Discussed use, benefit, and side effects of prescribed medications. Barriers to medication compliance addressed. All her questions were answered. Pt voiced understanding. Clemencia Nogueira will continue current medications, diet and exercise. Of the 30 minute duration appointment visit, Shmuel Tracey CNP spent at least 50% of the face-to-face time in counseling, explanation of diagnosis, planning of further management, and answering all questions.           Signed:  Shmuel Tracey CNP

## 2021-01-08 DIAGNOSIS — E03.9 HYPOTHYROIDISM, UNSPECIFIED TYPE: Primary | ICD-10-CM

## 2021-01-08 RX ORDER — LEVOTHYROXINE SODIUM 88 UG/1
88 TABLET ORAL DAILY
Qty: 90 TABLET | Refills: 0 | Status: SHIPPED | OUTPATIENT
Start: 2021-01-08 | End: 2021-05-04 | Stop reason: SDUPTHER

## 2021-02-23 ENCOUNTER — TELEPHONE (OUTPATIENT)
Dept: FAMILY MEDICINE CLINIC | Age: 66
End: 2021-02-23

## 2021-02-25 ENCOUNTER — OFFICE VISIT (OUTPATIENT)
Dept: FAMILY MEDICINE CLINIC | Age: 66
End: 2021-02-25
Payer: MEDICARE

## 2021-02-25 VITALS
WEIGHT: 162 LBS | OXYGEN SATURATION: 98 % | HEART RATE: 77 BPM | BODY MASS INDEX: 32.72 KG/M2 | DIASTOLIC BLOOD PRESSURE: 70 MMHG | TEMPERATURE: 97.2 F | SYSTOLIC BLOOD PRESSURE: 140 MMHG

## 2021-02-25 DIAGNOSIS — Z09 HOSPITAL DISCHARGE FOLLOW-UP: ICD-10-CM
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
## 2021-02-25 PROBLEM — S83.242A TEAR OF MEDIAL MENISCUS OF LEFT KNEE, CURRENT: Status: ACTIVE | Noted: 2020-02-05

## 2021-02-25 PROBLEM — M17.11 PRIMARY OSTEOARTHRITIS OF RIGHT KNEE: Status: ACTIVE | Noted: 2019-10-17

## 2021-02-25 PROCEDURE — 99214 OFFICE O/P EST MOD 30 MIN: CPT | Performed by: NURSE PRACTITIONER

## 2021-02-25 PROCEDURE — 4040F PNEUMOC VAC/ADMIN/RCVD: CPT | Performed by: NURSE PRACTITIONER

## 2021-02-25 PROCEDURE — 3017F COLORECTAL CA SCREEN DOC REV: CPT | Performed by: NURSE PRACTITIONER

## 2021-02-25 PROCEDURE — 1090F PRES/ABSN URINE INCON ASSESS: CPT | Performed by: NURSE PRACTITIONER

## 2021-02-25 PROCEDURE — G8399 PT W/DXA RESULTS DOCUMENT: HCPCS | Performed by: NURSE PRACTITIONER

## 2021-02-25 PROCEDURE — 1123F ACP DISCUSS/DSCN MKR DOCD: CPT | Performed by: NURSE PRACTITIONER

## 2021-02-25 PROCEDURE — 1036F TOBACCO NON-USER: CPT | Performed by: NURSE PRACTITIONER

## 2021-02-25 PROCEDURE — G8484 FLU IMMUNIZE NO ADMIN: HCPCS | Performed by: NURSE PRACTITIONER

## 2021-02-25 PROCEDURE — G8427 DOCREV CUR MEDS BY ELIG CLIN: HCPCS | Performed by: NURSE PRACTITIONER

## 2021-02-25 PROCEDURE — G8417 CALC BMI ABV UP PARAM F/U: HCPCS | Performed by: NURSE PRACTITIONER

## 2021-02-25 PROCEDURE — 1111F DSCHRG MED/CURRENT MED MERGE: CPT | Performed by: NURSE PRACTITIONER

## 2021-02-25 RX ORDER — HYDROCODONE BITARTRATE AND ACETAMINOPHEN 5; 325 MG/1; MG/1
1 TABLET ORAL EVERY 6 HOURS PRN
COMMUNITY
Start: 2021-02-22 | End: 2021-02-25

## 2021-02-25 RX ORDER — IBUPROFEN 800 MG/1
800 TABLET ORAL
Qty: 90 TABLET | Refills: 0 | Status: SHIPPED | OUTPATIENT
Start: 2021-02-25 | End: 2021-03-19

## 2021-02-25 NOTE — PROGRESS NOTES
[place] [address]  [id number] [facility]. [facility]  Lakisha Mcm[address]  [phone]  [phone]    Young Garcia is a 73 y.o. female who is here with c/o of:    Chief Complaint: Follow-Up from [facility] 21 fall, bruised ribs)      Patient Accompanied by: patient and spouse    HPI - Young Garcia is here today with c/o:    Patient here for hospital follow up after falling from her bed into a bay window seat after she was trying to replace a light bulb on . She had CT chest that was normal. Complains of pain left lateral chest/rib. Reports she is taking Norco without relief. She says any type of movement causes pain. Does get occasional shortness of breath with breathing. She says it hurts to breath and cough.        Health Maintenance Due   Topic Date Due    HIV screen  1971    COVID-19 Vaccine (1 of 2) 1972    Shingles Vaccine (1 of 2) 2006    Breast cancer screen  2019    Annual Wellness Visit (AWV)  2021        Patient Active Problem List:     Osteopenia     FH: breast cancer in first degree relative     Hx of thyroid nodule     Hypothyroidism     Electronic cigarette use     Vaginal dryness, menopausal     Urgency of urination     Anxiety and depression     Primary osteoarthritis of right knee     Tear of medial meniscus of left knee, current     Past Medical History:   Diagnosis Date    Fracture     Thyroid disease     Dr. Buckley Labor      Past Surgical History:   Procedure Laterality Date    ANKLE SURGERY      APPENDECTOMY      HYSTERECTOMY      HYSTERECTOMY, TOTAL ABDOMINAL       Family History   Problem Relation Age of Onset    Breast Cancer Mother     Heart Attack Mother     Heart Attack Sister     Other Brother         HIV     Social History     Tobacco Use    Smoking status: Former Smoker     Types: Cigarettes     Quit date: 3/13/2013     Years since quittin.9    Smokeless tobacco: Never Used   [facility] Tobacco comment: uses electric cig   Substance Use Topics    Alcohol use: No     ALLERGIES:  No Known Allergies       Subjective   Review of Systems   · Constitutional:  Negative for activity change, appetite change,unexpected weight change, chills, fever, and fatigue. · HENT: Negative for ear pain, sore throat,  Rhinorrhea, sinus pain, sinus pressure, congestion. · Eyes:  Negative for pain and discharge. · Respiratory:  Negative for chest tightness, shortness of breath, wheezing, and cough. · Cardiovascular:  Negative for chest pain, palpitations and leg swelling. · Gastrointestinal: Negative for abdominal pain, blood in stool, constipation,diarrhea, nausea and vomiting. · Endocrine: Negative for cold intolerance, heat intolerance, polydipsia, polyphagia and polyuria. · Genitourinary: Negative for difficulty urinating, dysuria, flank pain, frequency, hematuria and urgency. · Musculoskeletal: Negative for arthralgias, back pain, joint swelling, myalgias, neck pain and neck stiffness. + left lateral chest/rib pain  · Skin: Negative for rash and wound. · Allergic/Immunologic: Negative for environmental allergies and food allergies. · Neurological:  Negative for dizziness, light-headedness, numbness and headaches. · Hematological:  Negative for adenopathy. Does not bruise/bleed easily. · Psychiatric/Behavioral: Negative for self-injury, sleep disturbance and suicidal ideas. Objective   Physical Exam  Constitutional:       General: She is in acute distress. Chest:      Chest wall: Swelling and tenderness present. PHYSICAL EXAM:   · Constitutional: Tana Godinez is oriented to person, place, and time. Vital signs are normal. Appears well-developed and well-nourished. · Head: Normocephalic and atraumatic. · Eyes:PERRL, EOMI, Conjunctiva normal, No discharge. · Neck: Full passive range of motion. Non-tender on palpation. Neck supple. No thyromegaly present.  Trachea Lymphocytes 01/06/2021 52* 24 - 43 % Final    Monocytes 01/06/2021 7  3 - 12 % Final    Eosinophils % 01/06/2021 1  1 - 4 % Final    Basophils 01/06/2021 1  0 - 2 % Final    Immature Granulocytes 01/06/2021 0  0 % Final    Segs Absolute 01/06/2021 2.62  1.50 - 8.10 k/uL Final    Absolute Lymph # 01/06/2021 3.50  1.10 - 3.70 k/uL Final    Absolute Mono # 01/06/2021 0.43  0.10 - 1.20 k/uL Final    Absolute Eos # 01/06/2021 0.03  0.00 - 0.44 k/uL Final    Basophils Absolute 01/06/2021 0.04  0.00 - 0.20 k/uL Final    Absolute Immature Granulocyte 01/06/2021 <0.03  0.00 - 0.30 k/uL Final    WBC Morphology 01/06/2021 NOT REPORTED   Final    RBC Morphology 01/06/2021 NOT REPORTED   Final    Platelet Estimate 92/63/0603 NOT REPORTED   Final    Glucose 01/06/2021 91  70 - 99 mg/dL Final    BUN 01/06/2021 10  8 - 23 mg/dL Final    CREATININE 01/06/2021 0.75  0.50 - 0.90 mg/dL Final    Bun/Cre Ratio 01/06/2021 NOT REPORTED  9 - 20 Final    Calcium 01/06/2021 9.7  8.6 - 10.4 mg/dL Final    Sodium 01/06/2021 142  135 - 144 mmol/L Final    Potassium 01/06/2021 4.6  3.7 - 5.3 mmol/L Final    Chloride 01/06/2021 106  98 - 107 mmol/L Final    CO2 01/06/2021 22  20 - 31 mmol/L Final    Anion Gap 01/06/2021 14  9 - 17 mmol/L Final    Alkaline Phosphatase 01/06/2021 54  35 - 104 U/L Final    ALT 01/06/2021 10  5 - 33 U/L Final    AST 01/06/2021 16  <32 U/L Final    Total Bilirubin 01/06/2021 0.61  0.3 - 1.2 mg/dL Final    Total Protein 01/06/2021 7.5  6.4 - 8.3 g/dL Final    Albumin 01/06/2021 4.5  3.5 - 5.2 g/dL Final    Albumin/Globulin Ratio 01/06/2021 1.5  1.0 - 2.5 Final    GFR Non- 01/06/2021 >60  >60 mL/min Final    GFR  01/06/2021 >60  >60 mL/min Final    GFR Comment 01/06/2021        Final    Comment: Average GFR for 61-76 years old:   80 mL/min/1.73sq m  Chronic Kidney Disease:   <60 mL/min/1.73sq m  Kidney failure:   <15 mL/min/1.73sq m              eGFR calculated using average adult body mass. Additional eGFR calculator available at:        [email or web address]            GFR Staging 01/06/2021 NOT REPORTED   Final    TSH 01/06/2021 0.14* 0.30 - 5.00 mIU/L Final    Cholesterol, Fasting 01/06/2021 236* <200 mg/dL Final    Comment:    Cholesterol Guidelines:      <200  Desirable   200-240  Borderline      >240  Undesirable         HDL 01/06/2021 68  >40 mg/dL Final    Comment:    HDL Guidelines:    <40     Undesirable   40-59    Borderline    >59     Desirable         LDL Cholesterol 01/06/2021 149* 0 - 130 mg/dL Final    Comment:    LDL Guidelines:     <100    Desirable   100-129   Near to/above Desirable   130-159   Borderline      >159   Undesirable     Direct (measured) LDL and calculated LDL are not interchangeable tests.  Chol/HDL Ratio 01/06/2021 3.5  <5 Final            Triglyceride, Fasting 01/06/2021 94  <150 mg/dL Final    Comment:    Triglyceride Guidelines:     <150   Desirable   150-199  Borderline   200-499  High     >499   Very high   Based on AHA Guidelines for fasting triglyceride, October 2012.  VLDL 01/06/2021 NOT REPORTED  1 - 30 mg/dL Final    Thyroxine, Free 01/06/2021 1.53  0.93 - 1.70 ng/dL Final     No results found for this visit on 02/25/21. Completed Orders/Prescriptions   Orders Placed This Encounter   Medications    ibuprofen (ADVIL;MOTRIN) 800 MG tablet     Sig: Take 1 tablet by mouth 3 times daily (with meals)     Dispense:  90 tablet     Refill:  0               AssessmentPlan/Medical Decision Making     1. Fall, subsequent encounter    - GA DISCHARGE MEDS RECONCILED W/ CURRENT OUTPATIENT MED LIST  - ibuprofen (ADVIL;MOTRIN) 800 MG tablet; Take 1 tablet by mouth 3 times daily (with meals)  Dispense: 90 tablet; Refill: 0  - Continue Norco as needed and alternate with motrin  - Advised Rest, Ice    2.  Hospital discharge follow-up    - GA DISCHARGE MEDS RECONCILED W/ CURRENT OUTPATIENT MED LIST      Return in about 2 weeks (around 3/11/2021) for follow up fall. 1.  Era Vargas received counseling on the following healthy behaviors: n/a  2. Patient given educational materials - see patient instructions  3. Was a self-tracking handout given in paper form or via [employer]? No  If yes, see orders or list here. 4.  Discussed use, benefit, and side effects of prescribed medications. Barriers to medication compliance addressed. All patient questions answered. Pt voiced understanding. 5.  Reviewed prior labs, imaging, consultation, follow up, and health maintenance  6. Continue current medications, diet and exercise. 7. Discussed use, benefit, and side effects of prescribed medications. Barriers to medication compliance addressed. All her questions were answered. Pt voiced understanding. Era Vargas will continue current medications, diet and exercise. Of the 30 minute duration appointment visit, Caleb Vizcaino CNP spent at least 50% of the face-to-face time in counseling, explanation of diagnosis, planning of further management, and answering all questions.           Signed:  Caleb Vizcaino CNP

## 2021-03-12 ENCOUNTER — OFFICE VISIT (OUTPATIENT)
Dept: FAMILY MEDICINE CLINIC | Age: 66
End: 2021-03-12
Payer: MEDICARE

## 2021-03-12 VITALS
HEART RATE: 88 BPM | WEIGHT: 170 LBS | BODY MASS INDEX: 34.34 KG/M2 | TEMPERATURE: 97.2 F | SYSTOLIC BLOOD PRESSURE: 140 MMHG | OXYGEN SATURATION: 97 % | DIASTOLIC BLOOD PRESSURE: 80 MMHG
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
## 2021-03-12 DIAGNOSIS — Z09 FOLLOW-UP EXAM AFTER TREATMENT: ICD-10-CM

## 2021-03-12 PROCEDURE — 4040F PNEUMOC VAC/ADMIN/RCVD: CPT | Performed by: NURSE PRACTITIONER

## 2021-03-12 PROCEDURE — G8417 CALC BMI ABV UP PARAM F/U: HCPCS | Performed by: NURSE PRACTITIONER

## 2021-03-12 PROCEDURE — 3017F COLORECTAL CA SCREEN DOC REV: CPT | Performed by: NURSE PRACTITIONER

## 2021-03-12 PROCEDURE — G8484 FLU IMMUNIZE NO ADMIN: HCPCS | Performed by: NURSE PRACTITIONER

## 2021-03-12 PROCEDURE — G8399 PT W/DXA RESULTS DOCUMENT: HCPCS | Performed by: NURSE PRACTITIONER

## 2021-03-12 PROCEDURE — G8427 DOCREV CUR MEDS BY ELIG CLIN: HCPCS | Performed by: NURSE PRACTITIONER

## 2021-03-12 PROCEDURE — 1090F PRES/ABSN URINE INCON ASSESS: CPT | Performed by: NURSE PRACTITIONER

## 2021-03-12 PROCEDURE — 99213 OFFICE O/P EST LOW 20 MIN: CPT | Performed by: NURSE PRACTITIONER

## 2021-03-12 PROCEDURE — 1036F TOBACCO NON-USER: CPT | Performed by: NURSE PRACTITIONER

## 2021-03-12 PROCEDURE — 1123F ACP DISCUSS/DSCN MKR DOCD: CPT | Performed by: NURSE PRACTITIONER

## 2021-03-12 NOTE — PROGRESS NOTES
[facility]  [id number] [facility]. [place]  [address]isha [address]  [phone]  [phone]    Nayan Ashley is a 73 y.o. female who is here with c/o of:    Chief Complaint: 2 Week Follow-Up (2 week f/u fall doing better)      Patient Accompanied by: n/a    HPI - Nayan Ashley is here today with c/o:    Patient here for re evaluation of her fall from 2 weeks ago. Reports that she is feeling better. She is still taking Motrin prn for  discomfort and helps with the pain. She is able to walk and move around more. She does continue to ice it and that helps.  She is  on her left lateral side of chest.      Health Maintenance Due   Topic Date Due    HIV screen  Never done    COVID-19 Vaccine (1) Never done    Breast cancer screen  2019    Annual Wellness Visit (AWV)  Never done        Patient Active Problem List:     Osteopenia     FH: breast cancer in first degree relative     Hx of thyroid nodule     Hypothyroidism     Electronic cigarette use     Vaginal dryness, menopausal     Urgency of urination     Anxiety and depression     Primary osteoarthritis of right knee     Tear of medial meniscus of left knee, current     Past Medical History:   Diagnosis Date    Fracture     Thyroid disease     Dr. Emma Krause      Past Surgical History:   Procedure Laterality Date    ANKLE SURGERY      APPENDECTOMY      HYSTERECTOMY      HYSTERECTOMY, TOTAL ABDOMINAL       Family History   Problem Relation Age of Onset    Breast Cancer Mother     Heart Attack Mother     Heart Attack Sister     Other Brother         HIV     Social History     Tobacco Use    Smoking status: Former Smoker     Packs/day: 0.50     Years: 38.00     Pack years: 19.00     Types: Cigarettes     Quit date: 3/13/2013     Years since quittin.0    Smokeless tobacco: Never Used    Tobacco comment: uses electric cig   Substance Use Topics    Alcohol use: No     ALLERGIES:  No Known Allergies       Subjective   Review of Systems   · Constitutional:  Negative for activity change, appetite change,unexpected weight change, chills, fever, and fatigue. · HENT: Negative for ear pain, sore throat,  Rhinorrhea, sinus pain, sinus pressure, congestion. · Eyes:  Negative for pain and discharge. · Respiratory:  Negative for chest tightness, shortness of breath, wheezing, and cough. · Cardiovascular:  Negative for chest pain, palpitations and leg swelling. · Gastrointestinal: Negative for abdominal pain, blood in stool, constipation,diarrhea, nausea and vomiting. · Endocrine: Negative for cold intolerance, heat intolerance, polydipsia, polyphagia and polyuria. · Genitourinary: Negative for difficulty urinating, dysuria, flank pain, frequency, hematuria and urgency. · Musculoskeletal: Negative for arthralgias, back pain, joint swelling, myalgias, neck pain and neck stiffness. · Skin: Negative for rash and wound. · Allergic/Immunologic: Negative for environmental allergies and food allergies. · Neurological:  Negative for dizziness, light-headedness, numbness and headaches. · Hematological:  Negative for adenopathy. Does not bruise/bleed easily. · Psychiatric/Behavioral: Negative for self-injury, sleep disturbance and suicidal ideas. Objective   Physical Exam  Chest:      Chest wall: Tenderness present. No mass, lacerations, deformity, swelling, crepitus or edema. PHYSICAL EXAM:   · Constitutional: Annetta Hurley is oriented to person, place, and time. Vital signs are normal. Appears well-developed and well-nourished. · Head: Normocephalic and atraumatic. · Neck: Full passive range of motion. Non-tender on palpation. Neck supple. No thyromegaly present. Trachea normal.  · Cardiovascular: Normal rate, regular rhythm, S1, S2, no murmur, no gallop, no friction rub, intact distal pulses.     · Pulmonary/Chest: Breath sounds are clear throughout, No respiratory distress, No wheezing, No chest tenderness. Effort normal  · Musculoskeletal: Extremities appear regular and symmetric. No evident masses, lesions, foreign bodies, or other abnormalities. No edema. No tenderness on palpation. Joints are stable. Full ROM, strength and tone are within normal limits. · Neurological: Alert and oriented to person, place, and time. Normal motor function, Normal sensory function, No focal deficits noted. He has normal strength. · Skin: Skin is warm, dry and intact. No obvious lesions on exposed skin  · Psychiatric: Normal mood and affect. Speech is normal and behavior is normal.     Nursing note and vitals reviewed. Blood pressure (!) 140/80, pulse 88, temperature 97.2 °F (36.2 °C), temperature source Temporal, weight 170 lb (77.1 kg), SpO2 97 %, not currently breastfeeding. Body mass index is 34.34 kg/m².     Wt Readings from Last 3 Encounters:   03/12/21 170 lb (77.1 kg)   02/25/21 162 lb (73.5 kg)   01/06/21 162 lb 3.2 oz (73.6 kg)     BP Readings from Last 3 Encounters:   03/12/21 (!) 140/80   02/25/21 (!) 140/70   01/06/21 124/80       Hospital Outpatient Visit on 01/06/2021   Component Date Value Ref Range Status    WBC 01/06/2021 6.6  3.5 - 11.3 k/uL Final    RBC 01/06/2021 4.68  3.95 - 5.11 m/uL Final    Hemoglobin 01/06/2021 13.2  11.9 - 15.1 g/dL Final    Hematocrit 01/06/2021 42.7  36.3 - 47.1 % Final    MCV 01/06/2021 91.2  82.6 - 102.9 fL Final    MCH 01/06/2021 28.2  25.2 - 33.5 pg Final    MCHC 01/06/2021 30.9  28.4 - 34.8 g/dL Final    RDW 01/06/2021 13.7  11.8 - 14.4 % Final    Platelets 34/98/0992 282  138 - 453 k/uL Final    MPV 01/06/2021 11.3  8.1 - 13.5 fL Final    NRBC Automated 01/06/2021 0.0  0.0 per 100 WBC Final    Differential Type 01/06/2021 NOT REPORTED   Final    Seg Neutrophils 01/06/2021 39  36 - 65 % Final    Lymphocytes 01/06/2021 52* 24 - 43 % Final    Monocytes 01/06/2021 7  3 - 12 % Final    Eosinophils % 01/06/2021 1  1 - 4 % Final    Basophils 01/06/2021 1  0 - 2 % Final    Immature Granulocytes 01/06/2021 0  0 % Final    Segs Absolute 01/06/2021 2.62  1.50 - 8.10 k/uL Final    Absolute Lymph # 01/06/2021 3.50  1.10 - 3.70 k/uL Final    Absolute Mono # 01/06/2021 0.43  0.10 - 1.20 k/uL Final    Absolute Eos # 01/06/2021 0.03  0.00 - 0.44 k/uL Final    Basophils Absolute 01/06/2021 0.04  0.00 - 0.20 k/uL Final    Absolute Immature Granulocyte 01/06/2021 <0.03  0.00 - 0.30 k/uL Final    WBC Morphology 01/06/2021 NOT REPORTED   Final    RBC Morphology 01/06/2021 NOT REPORTED   Final    Platelet Estimate 87/27/2741 NOT REPORTED   Final    Glucose 01/06/2021 91  70 - 99 mg/dL Final    BUN 01/06/2021 10  8 - 23 mg/dL Final    CREATININE 01/06/2021 0.75  0.50 - 0.90 mg/dL Final    Bun/Cre Ratio 01/06/2021 NOT REPORTED  9 - 20 Final    Calcium 01/06/2021 9.7  8.6 - 10.4 mg/dL Final    Sodium 01/06/2021 142  135 - 144 mmol/L Final    Potassium 01/06/2021 4.6  3.7 - 5.3 mmol/L Final    Chloride 01/06/2021 106  98 - 107 mmol/L Final    CO2 01/06/2021 22  20 - 31 mmol/L Final    Anion Gap 01/06/2021 14  9 - 17 mmol/L Final    Alkaline Phosphatase 01/06/2021 54  35 - 104 U/L Final    ALT 01/06/2021 10  5 - 33 U/L Final    AST 01/06/2021 16  <32 U/L Final    Total Bilirubin 01/06/2021 0.61  0.3 - 1.2 mg/dL Final    Total Protein 01/06/2021 7.5  6.4 - 8.3 g/dL Final    Albumin 01/06/2021 4.5  3.5 - 5.2 g/dL Final    Albumin/Globulin Ratio 01/06/2021 1.5  1.0 - 2.5 Final    GFR Non- 01/06/2021 >60  >60 mL/min Final    GFR  01/06/2021 >60  >60 mL/min Final    GFR Comment 01/06/2021        Final    Comment: Average GFR for 61-76 years old:   80 mL/min/1.73sq m  Chronic Kidney Disease:   <60 mL/min/1.73sq m  Kidney failure:   <15 mL/min/1.73sq m              eGFR calculated using average adult body mass.  Additional eGFR calculator available at:        [employer]  GFR Staging 01/06/2021 NOT REPORTED   Final    TSH 01/06/2021 0.14* 0.30 - 5.00 mIU/L Final    Cholesterol, Fasting 01/06/2021 236* <200 mg/dL Final    Comment:    Cholesterol Guidelines:      <200  Desirable   200-240  Borderline      >240  Undesirable         HDL 01/06/2021 68  >40 mg/dL Final    Comment:    HDL Guidelines:    <40     Undesirable   40-59    Borderline    >59     Desirable         LDL Cholesterol 01/06/2021 149* 0 - 130 mg/dL Final    Comment:    LDL Guidelines:     <100    Desirable   100-129   Near to/above Desirable   130-159   Borderline      >159   Undesirable     Direct (measured) LDL and calculated LDL are not interchangeable tests.  Chol/HDL Ratio 01/06/2021 3.5  <5 Final            Triglyceride, Fasting 01/06/2021 94  <150 mg/dL Final    Comment:    Triglyceride Guidelines:     <150   Desirable   150-199  Borderline   200-499  High     >499   Very high   Based on AHA Guidelines for fasting triglyceride, October 2012.  VLDL 01/06/2021 NOT REPORTED  1 - 30 mg/dL Final    Thyroxine, Free 01/06/2021 1.53  0.93 - 1.70 ng/dL Final     No results found for this visit on 03/12/21. Completed Orders/Prescriptions   No orders of the defined types were placed in this encounter. AssessmentPlan/Medical Decision Making     1. Fall, subsequent encounter      2. Follow-up exam after treatment    - Continues to improve. Advised to continue prn motrin and tylenol as needed for discomfort. Advised to gradually improved activity as she feels better. Return for as scheduled. 1.  Clemencia Nogueira received counseling on the following healthy behaviors: n/a  2. Patient given educational materials - see patient instructions  3. Was a self-tracking handout given in paper form or via [employer]? No  If yes, see orders or list here. 4.  Discussed use, benefit, and side effects of prescribed medications. Barriers to medication compliance addressed.   All patient questions answered. Pt voiced understanding. 5.  Reviewed prior labs, imaging, consultation, follow up, and health maintenance  6. Continue current medications, diet and exercise. 7. Discussed use, benefit, and side effects of prescribed medications. Barriers to medication compliance addressed. All her questions were answered. Pt voiced understanding. Samantha Black will continue current medications, diet and exercise. Of the 20 minute duration appointment visit, Mirta Alonso CNP spent at least 50% of the face-to-face time in counseling, explanation of diagnosis, planning of further management, and answering all questions.           Signed:  Mirta Alonso CNP

## 2021-04-13 ENCOUNTER — TELEPHONE (OUTPATIENT)
Dept: FAMILY MEDICINE CLINIC | Age: 66
End: 2021-04-13

## 2021-04-13 DIAGNOSIS — L60.1 ONYCHOLYSIS: Primary | ICD-10-CM

## 2021-04-13 NOTE — TELEPHONE ENCOUNTER
She had acrylic nails and she broke that nail, it has been a few weeks or so. She says it does not hurt, but it is ugly and she wants it removed?

## 2021-04-13 NOTE — TELEPHONE ENCOUNTER
I am not sure what she is referring to. Did she injure her finger? Without seeing her I don't know what to tell her.

## 2021-04-13 NOTE — TELEPHONE ENCOUNTER
pts right hand pointer finger is \"hanging a little\"  Pt feels that it is best that it comes off now , is this something  You will do or what do you advise?

## 2021-04-13 NOTE — TELEPHONE ENCOUNTER
Pt advised, she her insurance will let her see anyone, she says she talked to them.  So she would like a referral to someone of your choosing

## 2021-05-04 ENCOUNTER — OFFICE VISIT (OUTPATIENT)
Dept: FAMILY MEDICINE CLINIC | Age: 66
End: 2021-05-04
Payer: MEDICARE

## 2021-05-04 VITALS
HEART RATE: 93 BPM | DIASTOLIC BLOOD PRESSURE: 64 MMHG | WEIGHT: 163 LBS | OXYGEN SATURATION: 98 % | SYSTOLIC BLOOD PRESSURE: 138 MMHG | TEMPERATURE: 97.3 F | HEIGHT: 59 IN | BODY MASS INDEX: 32.86 KG/M2

## 2021-05-04 DIAGNOSIS — M85.80 OSTEOPENIA, UNSPECIFIED LOCATION: ICD-10-CM

## 2021-05-04 DIAGNOSIS — E03.9 HYPOTHYROIDISM, UNSPECIFIED TYPE: ICD-10-CM

## 2021-05-04 DIAGNOSIS — Z71.89 ACP (ADVANCE CARE PLANNING): ICD-10-CM

## 2021-05-04 DIAGNOSIS — Z00.00 ROUTINE GENERAL MEDICAL EXAMINATION AT A HEALTH CARE FACILITY: Primary | ICD-10-CM

## 2021-05-04 DIAGNOSIS — F32.A ANXIETY AND DEPRESSION: ICD-10-CM

## 2021-05-04 DIAGNOSIS — F41.9 ANXIETY AND DEPRESSION: ICD-10-CM

## 2021-05-04 PROCEDURE — 4040F PNEUMOC VAC/ADMIN/RCVD: CPT | Performed by: NURSE PRACTITIONER

## 2021-05-04 PROCEDURE — G0402 INITIAL PREVENTIVE EXAM: HCPCS | Performed by: NURSE PRACTITIONER

## 2021-05-04 PROCEDURE — 1123F ACP DISCUSS/DSCN MKR DOCD: CPT | Performed by: NURSE PRACTITIONER

## 2021-05-04 PROCEDURE — 3017F COLORECTAL CA SCREEN DOC REV: CPT | Performed by: NURSE PRACTITIONER

## 2021-05-04 RX ORDER — ALENDRONATE SODIUM 70 MG/1
TABLET ORAL
Qty: 12 TABLET | Refills: 1 | Status: SHIPPED | OUTPATIENT
Start: 2021-05-04 | End: 2021-12-03 | Stop reason: SDUPTHER

## 2021-05-04 RX ORDER — LEVOTHYROXINE SODIUM 88 UG/1
88 TABLET ORAL DAILY
Qty: 90 TABLET | Refills: 0 | Status: SHIPPED | OUTPATIENT
Start: 2021-05-04 | End: 2021-10-25 | Stop reason: SDUPTHER

## 2021-05-04 ASSESSMENT — PATIENT HEALTH QUESTIONNAIRE - PHQ9
2. FEELING DOWN, DEPRESSED OR HOPELESS: 0
SUM OF ALL RESPONSES TO PHQ9 QUESTIONS 1 & 2: 0
SUM OF ALL RESPONSES TO PHQ QUESTIONS 1-9: 0

## 2021-05-04 ASSESSMENT — LIFESTYLE VARIABLES: HOW OFTEN DO YOU HAVE A DRINK CONTAINING ALCOHOL: 0

## 2021-05-04 NOTE — PROGRESS NOTES
Medicare Annual Wellness Visit  Name: Skip Godinez Date: 2021   MRN: [id number] Sex: Female   Age: 73 y.o. Ethnicity: Non-/Non    : 1955 Race: Virginie Cook is here for Medicare AWV    Screenings for behavioral, psychosocial and functional/safety risks, and cognitive dysfunction are all negative except as indicated below. These results, as well as other patient data from the [address] form, are documented in Flowsheets linked to this Encounter. No Known Allergies      Prior to Visit Medications    Medication Sig Taking?  Authorizing Provider   alendronate (FOSAMAX) 70 MG tablet TAKE 1 TABLET BY MOUTH EVERY 7 DAYS Yes ИВНА William CNP   levothyroxine (SYNTHROID) 88 MCG tablet Take 1 tablet by mouth daily Yes ИВАН William CNP   sertraline (ZOLOFT) 50 MG tablet Take 1 tablet by mouth daily Yes ИВАН William CNP   ibuprofen (ADVIL;MOTRIN) 800 MG tablet TAKE 1 TABLET BY MOUTH 3 TIMES DAILY WITH MEALS Yes ИВАН William CNP         Past Medical History:   Diagnosis Date    Fracture     Thyroid disease     Dr. Ha Friend       Past Surgical History:   Procedure Laterality Date    ANKLE SURGERY      APPENDECTOMY      HYSTERECTOMY      HYSTERECTOMY, TOTAL ABDOMINAL           Family History   Problem Relation Age of Onset    Breast Cancer Mother     Heart Attack Mother     Heart Attack Sister     Other Brother         HIV       CareTeam (Including outside providers/suppliers regularly involved in providing care):   Patient Care Team:  ИВАН William CNP as PCP - General (Nurse Practitioner)  ИВАН William CNP as PCP - REHABILITATION [facility] Empaneled Provider    Wt Readings from Last 3 Encounters:   21 163 lb (73.9 kg)   21 170 lb (77.1 kg)   21 162 lb (73.5 kg)     Vitals:    21 0908   BP: 138/64   Site: Left Upper Arm   Position: Sitting   Cuff Size: Medium Adult   Pulse: 93   Temp: 97.3 °F (36.3 °C)   TempSrc: Temporal   SpO2: 98%   Weight: 163 lb (73.9 kg)   Height: 4' 11\" (1.499 m)     Body mass index is 32.92 kg/m². Based upon direct observation of the patient, evaluation of cognition reveals recent and remote memory intact. Patient's complete Health Risk Assessment and screening values have been reviewed and are found in Flowsheets. The following problems were reviewed today and where indicated follow up appointments were made and/or referrals ordered. Positive Risk Factor Screenings with Interventions:     Fall Risk:  2 or more falls in past year?: (!) yes  Fall with injury in past year?: (!) yes  Fall Risk Interventions:    · had accidental fall trying to put up a new light          General Health and ACP:  General  In general, how would you say your health is?: Good  In the past 7 days, have you experienced any of the following?  New or Increased Pain, New or Increased Fatigue, Loneliness, Social Isolation, Stress or Anger?: None of These  Do you get the social and emotional support that you need?: Yes  Do you have a Living Will?: (!) No  Advance Directives     Power of [facility] Will ACP-Advance Directive ACP-Power of     Not on File Not on File Not on File Not on File      General Health Risk Interventions:  · No Living Will: Advance Care Planning addressed with patient today and Patient referred to [place] Habits/Nutrition:  Health Habits/Nutrition  Do you exercise for at least 20 minutes 2-3 times per week?: Yes  Have you lost any weight without trying in the past 3 months?: No  Do you eat only one meal per day?: No  Have you seen the dentist within the past year?: Yes  Body mass index: (!) 32.92  Health Habits/Nutrition Interventions:  · n/a     Safety:  Safety  Do you have working smoke detectors?: Yes  Have all throw rugs been removed or fastened?: (!) No  Do you have non-slip mats or surfaces in all bathtubs/showers?: Yes  Do all of your stairways have a railing or banister?: Yes  Are your doorways, halls and stairs free of clutter?: Yes  Do you always fasten your seatbelt when you are in a car?: Yes  Safety Interventions:  · Home safety tips provided     Personalized Preventive Plan   Current Health Maintenance Status  Immunization History   Administered Date(s) Administered    COVID-19, Moderna, PF, 100mcg/0.5mL 03/16/2021, 04/13/2021    Hepatitis A Adult (Vaqta) 05/18/2018    Influenza 10/10/2013    Influenza Vaccine, unspecified formulation 09/28/2016, 10/15/2017, 10/13/2018    Influenza, Intradermal, Preservative free 09/02/2019    Influenza, Quadv, IM, PF (6 mo and older Fluzone, Flulaval, Fluarix, and 3 yrs and older Afluria) 10/03/2019    Influenza, Quadv, adjuvanted, 65 yrs +, IM, PF (Fluad) 10/05/2020    Pneumococcal Polysaccharide (Uvhvalruf15) 10/13/2018    Tdap (Boostrix, Adacel) 03/05/2019        Health Maintenance   Topic Date Due    Breast cancer screen  11/08/2019    Annual Wellness Visit (AWV)  Never done    Shingles Vaccine (1 of 2) 03/12/2022 (Originally 1/11/2006)    HIV screen  05/04/2022 (Originally 1/11/1970)    TSH testing  01/06/2022    Pneumococcal 65+ years Vaccine (1 of 1 - PPSV23) 10/13/2023    Lipid screen  01/06/2026    Colon cancer screen colonoscopy  07/20/2026    DTaP/Tdap/Td vaccine (2 - Td) 03/05/2029    DEXA (modify frequency per FRAX score)  Completed    Flu vaccine  Completed    COVID-19 Vaccine  Completed    Hepatitis C screen  Completed    Hepatitis A vaccine  Aged Out    Hepatitis B vaccine  Aged Out    Hib vaccine  Aged Out    Meningococcal (ACWY) vaccine  Aged Out     Recommendations for Wi3 Due: see orders and patient instructions/AVS.  . Recommended screening schedule for the next 5-10 years is provided to the patient in written form: see Patient Instructions/AVS.    Jamel Garcia was seen today for medicare awv.     Diagnoses and all orders for this visit:    Routine general medical examination at a health care facility    ACP (advance care planning)  -     [facility] Referral to ACP Clinical Specialist    Hypothyroidism, unspecified type  -     levothyroxine (SYNTHROID) 88 MCG tablet; Take 1 tablet by mouth daily        - REFILL ONLY  Anxiety and depression  -     sertraline (ZOLOFT) 50 MG tablet;  Take 1 tablet by mouth daily        - REFILL ONLY  Osteopenia, unspecified location  -     alendronate (FOSAMAX) 70 MG tablet; TAKE 1 TABLET BY MOUTH EVERY 7 DAYS        - REFILL ONLY             Electronically Signed by: ИВАН Chambers-CNP

## 2021-05-19 ENCOUNTER — TELEPHONE (OUTPATIENT)
Dept: SPIRITUAL SERVICES | Age: 66
End: 2021-05-19

## 2021-05-19 NOTE — TELEPHONE ENCOUNTER
[unfilled]  Advance Care Planning Note  Ambulatory Spiritual Care Services    Date:  5/19/2021    Received request from [employer]. Consultation conversation participants:   Patient who understands ACP conversation     Goals of ACP Conversation:  Discuss advance care planning documents    Health Care Decision Makers:    [unfilled]   Click here to complete Healthcare Decision Makers including selection of the Healthcare Decision Maker Relationship (ie \"Primary\"). Today we:  Verified Documents    Advance Care Planning Documents (Patient Wishes)  Currently on file:   None    Assessment:   The patient was not interested in ACP and only vaguely remembers a conversation with her healthcare provider. In the assessment narrative, address the way holistic dimensions influence ACP decisions - medical, psychological, psychosocial, family systems, ethical, cultural, societal and spiritual (Optional):[id number]}    Interventions:  Deferred conversation as patient not interested in completing an advance directive at this time    Outcomes:  Routed ACP note to attending provider or other IDT member.     Patient / Healthcare Decision Maker Instructions:  Urged patient to consider ACP    Electronically signed by Kadie Hendricks on 5/19/2021 at 6:08 PM.  [unfilled]

## 2021-07-08 ENCOUNTER — OFFICE VISIT (OUTPATIENT)
Dept: FAMILY MEDICINE CLINIC | Age: 66
End: 2021-07-08
Payer: MEDICARE

## 2021-07-08 VITALS
DIASTOLIC BLOOD PRESSURE: 82 MMHG | TEMPERATURE: 97.3 F | WEIGHT: 164 LBS | SYSTOLIC BLOOD PRESSURE: 138 MMHG | HEART RATE: 92 BPM | BODY MASS INDEX: 33.12 KG/M2 | OXYGEN SATURATION: 99 %

## 2021-07-08 DIAGNOSIS — F32.A ANXIETY AND DEPRESSION: Primary | ICD-10-CM

## 2021-07-08 DIAGNOSIS — M85.80 OSTEOPENIA, UNSPECIFIED LOCATION: ICD-10-CM

## 2021-07-08 DIAGNOSIS — E78.2 MIXED HYPERLIPIDEMIA: ICD-10-CM

## 2021-07-08 DIAGNOSIS — E03.9 HYPOTHYROIDISM, UNSPECIFIED TYPE: ICD-10-CM

## 2021-07-08 DIAGNOSIS — F41.9 ANXIETY AND DEPRESSION: Primary | ICD-10-CM

## 2021-07-08 PROCEDURE — G8417 CALC BMI ABV UP PARAM F/U: HCPCS | Performed by: NURSE PRACTITIONER

## 2021-07-08 PROCEDURE — 4040F PNEUMOC VAC/ADMIN/RCVD: CPT | Performed by: NURSE PRACTITIONER

## 2021-07-08 PROCEDURE — 1090F PRES/ABSN URINE INCON ASSESS: CPT | Performed by: NURSE PRACTITIONER

## 2021-07-08 PROCEDURE — G8399 PT W/DXA RESULTS DOCUMENT: HCPCS | Performed by: NURSE PRACTITIONER

## 2021-07-08 PROCEDURE — 3017F COLORECTAL CA SCREEN DOC REV: CPT | Performed by: NURSE PRACTITIONER

## 2021-07-08 PROCEDURE — 1123F ACP DISCUSS/DSCN MKR DOCD: CPT | Performed by: NURSE PRACTITIONER

## 2021-07-08 PROCEDURE — 1036F TOBACCO NON-USER: CPT | Performed by: NURSE PRACTITIONER

## 2021-07-08 PROCEDURE — 99214 OFFICE O/P EST MOD 30 MIN: CPT | Performed by: NURSE PRACTITIONER

## 2021-07-08 PROCEDURE — G8427 DOCREV CUR MEDS BY ELIG CLIN: HCPCS | Performed by: NURSE PRACTITIONER

## 2021-07-08 NOTE — PATIENT INSTRUCTIONS
Patient Education        Learning About High Cholesterol  What is high cholesterol? High cholesterol means that you have too much cholesterol in your blood. Cholesterol is a type of fat. It's needed for many body functions, such as making new cells. Cholesterol is made by your body. It also comes from food you eat. Having high cholesterol can lead to the buildup of plaque in artery walls. This can increase your risk of heart attack and stroke. When your doctor talks about high cholesterol levels, your doctor is talking about your total cholesterol and LDL cholesterol (the \"bad\" cholesterol) levels. Your doctor may also speak about HDL (the \"good\" cholesterol) levels. High HDL is linked with a lower risk for coronary artery disease, heart attack, and stroke. Your cholesterol levels help your doctor find out your risk for having a heart attack or stroke. How can you prevent high cholesterol? A heart-healthy lifestyle can help you prevent high cholesterol. This lifestyle helps lower your risk for a heart attack and stroke. · Eat heart-healthy foods. ? Eat fruits, vegetables, whole grains, beans, and other high-fiber foods. ? Eat lean proteins, such as seafood, lean meats, beans, nuts, and soy products. ? Eat healthy fats, such as canola and olive oil. ? Choose foods that are low in saturated fat. ? Limit sodium and alcohol. ? Limit drinks and foods with added sugar. · Be active. Try to do moderate activity at least 2½ hours a week. Or try to do vigorous activity at least 1¼ hours a week. You may want to walk or try other activities, such as running, swimming, cycling, or playing tennis or team sports. · Stay at a healthy weight. Lose weight if you need to. · Don't smoke. If you need help quitting, talk to your doctor about stop-smoking programs and medicines. These can increase your chances of quitting for good. How is high cholesterol treated?   The goal of treatment is to reduce your chances of having a heart attack or stroke. The goal is not to lower your cholesterol numbers only. · Have a heart-healthy lifestyle. This includes eating healthy foods, not smoking, losing weight, and being more active. · You may choose to take medicine. Follow-up care is a key part of your treatment and safety. Be sure to make and go to all appointments, and call your doctor if you are having problems. It's also a good idea to know your test results and keep a list of the medicines you take. Where can you learn more? Go to https://InkerwangpepicewTapCrowd.Weilos. org and sign in to your [employer] account. Enter Y045 in the [employer] box to learn more about \"Learning About High Cholesterol. \"     If you do not have an account, please click on the \"Sign Up Now\" link. Current as of: August 31, 2020               Content Version: 12.9  © 2006-2021 [employer]. Care instructions adapted under license by [facility] [place] ([facility]). If you have questions about a medical condition or this instruction, always ask your healthcare professional. [address] any warranty or liability for your use of this information. What lifestyle changes can I make to help improve my cholesterol levels? Exercise regularly. Exercise can raise HDL cholesterol levels and reduce levels of LDL cholesterol and triglycerides. If you haven't been exercising, try to work up to 30 minutes, 4 to 6 times a week. Make sure you talk to your doctor before starting an exercise plan. Lose weight if you are overweight. Being overweight can raise your cholesterol levels. Losing weight, even just 5 or 10 pounds, can lower your total cholesterol, LDL cholesterol and triglyceride levels. If you smoke, quit. Smoking lowers your HDL cholesterol. Even exposure to second-hand smoke can affect your HDL level. Talk to your doctor about developing a plan to help you stop smoking. Eat a heart-healthy diet.     Eat plenty of fresh fruits and vegetables. Fruits and vegetables are naturally low in fat. Not only do they add flavor and variety to your diet, but they are also the best source of fiber, vitamins and minerals for your body. Aim for 5 cups of fruits and vegetables every day, not counting potatoes, corn and rice. Potatoes, corn and rice count as carbohydrates. Pick \"good\" fats over \"bad\" fats. Fat is part of a healthy diet, but you need to know the difference between \"bad\" fats and \"good\" fats. \"Bad\" fats, such as saturated and trans fats, are found in foods such as butter; coconut and palm oil; saturated or partially hydrogenated vegetable fats such as shortening and margarine; animal fats in meats; and fats in whole milk dairy products. Limit the amount of saturated fat in your diet, and avoid trans fat completely. Unsaturated fat is the \"good\" fat. Most fats in fish, vegetables, grains and tree nuts are unsaturated. Try to eat unsaturated fat in place of saturated fat. For example, you can use olive oil or canola oil in cooking instead of butter. Use healthier cooking methods. Baking, broiling and roasting are the healthiest ways to prepare meat, poultry and other foods. Trim any outside fat or skin before cooking. Lean cuts can be pan-broiled or stir-fried. Use either a nonstick pan or nonstick cooking spray instead of adding fats such as butter or margarine. When eating out, ask how food is prepared. You can request that your food be baked, broiled or roasted, rather than fried. Look for other sources of protein. Fish, dry beans, tree nuts, peas and lentils offer protein, nutrients and fiber without the cholesterol and saturated fats that meats have. Consider eating one \"meatless\" meal each week. Try substituting beans for meat in a favorite recipe, such as lasagna or chili. Snack on a handful of almonds or pecans. Soy is also an excellent source of protein.  Good examples of soy include soy milk, edamame (green soy beans), tofu and soy protein shakes. Get more fiber in your diet. Add good sources of fiber to your meals. Examples include fruits, vegetables, whole grains (such as oat bran, whole and rolled oats and barley), legumes (such as beans and peas) and nuts and seeds (such as ground flax seed). In addition to fiber, whole grains supply B-vitamins and important nutrients not found in foods made with white flour. Eat more fish. Fish are an excellent source of omega-3 fatty acids. Wild-caught oily fish, such as salmon, tuna, mackerel and sardines, are the best sources of omega-3s, but all fish contain some amount of this beneficial fatty acid. Aim for 2 6-oz servings each week. Limit how much cholesterol you get in your diet. You should limit your overall cholesterol intake to less than 300 milligrams per day, or less than 200 milligrams if you have heart disease. Add supplements to your diet. Certain supplements may help improve your cholesterol levels if changing your diet isn't enough. Some examples include:    Plant sterols and stanols. Plant sterols and stanols can help keep your body from absorbing cholesterol. Sterols have been added to some foods, including margarines and spreads, orange juice and yogurt. You can also find sterols and stanols in some dietary supplements. Omega-3 fatty acids. If you have heart disease or high triglycerides, consider taking an omega-3 or fish oil supplement. Make sure the supplement has at least 1,000 mg of EPA and DHA (these are the specific omega-3 fatty acids found in fish). Red yeast rice. A common seasoning in  countries, red yeast rice may help reduce the amount of cholesterol your body makes. It is available as a dietary supplement. Talk to your doctor before taking red yeast rice, especially if you take another cholesterol-lowering medicine called a statin. The recommended dose of red yeast rice is 1,200 milligrams twice a day.

## 2021-07-08 NOTE — PROGRESS NOTES
[facility]  [id number] [facility]  [phone]  [phone]    Ivelisse Khan is a 73 y.o. female who is here with c/o of:    Chief Complaint: 6 Month Follow-Up, Depression, Anxiety, and Medication Refill (zoloft only)      Patient Accompanied by: n/a    HPI - Ivelisse Khan is here today with c/o:    Patient here for re evaluation of chronic conditions. Anxiety and depression-   Complaint with medication. Reports her mood is stable with the medication. Denies SI or HI.     Hypothyroid-   Follows with Dr Lauryn eduardo. TSH test utd. She does have enlarged thyroid that they regularly monitor.      Osteopenia-   Compliant with Fosamax. Dexa utd. Hyperlipidemia-  No medication currently. Does not watch her diet or exercise routinely. There are no preventive care reminders to display for this patient.      Patient Active Problem List:     Osteopenia     FH: breast cancer in first degree relative     Hx of thyroid nodule     Hypothyroidism     Electronic cigarette use     Vaginal dryness, menopausal     Urgency of urination     Anxiety and depression     Primary osteoarthritis of right knee     Tear of medial meniscus of left knee, current     Past Medical History:   Diagnosis Date    Fracture     Thyroid disease     Dr. Lauryn Vogt      Past Surgical History:   Procedure Laterality Date    ANKLE SURGERY      APPENDECTOMY      HYSTERECTOMY      HYSTERECTOMY, TOTAL ABDOMINAL       Family History   Problem Relation Age of Onset    Breast Cancer Mother     Heart Attack Mother     Heart Attack Sister     Other Brother         HIV     Social History     Tobacco Use    Smoking status: Former Smoker     Packs/day: 0.50     Years: 38.00     Pack years: 19.00     Types: Cigarettes     Quit date: 3/13/2013     Years since quittin.3    Smokeless tobacco: Never Used    Tobacco comment: uses electric cig   Substance Use Topics    Alcohol use: No     ALLERGIES:  No Known Allergies       Subjective   Review of Systems   · Constitutional:  Negative for activity change, appetite change,unexpected weight change, chills, fever, and fatigue. · HENT: Negative for ear pain, sore throat,  Rhinorrhea, sinus pain, sinus pressure, congestion. · Eyes:  Negative for pain and discharge. · Respiratory:  Negative for chest tightness, shortness of breath, wheezing, and cough. · Cardiovascular:  Negative for chest pain, palpitations and leg swelling. · Gastrointestinal: Negative for abdominal pain, blood in stool, constipation,diarrhea, nausea and vomiting. · Endocrine: Negative for cold intolerance, heat intolerance, polydipsia, polyphagia and polyuria. · Genitourinary: Negative for difficulty urinating, dysuria, flank pain, frequency, hematuria and urgency. · Musculoskeletal: Negative for arthralgias, back pain, joint swelling, myalgias, neck pain and neck stiffness. · Skin: Negative for rash and wound. · Allergic/Immunologic: Negative for environmental allergies and food allergies. · Neurological:  Negative for dizziness, light-headedness, numbness and headaches. · Hematological:  Negative for adenopathy. Does not bruise/bleed easily. · Psychiatric/Behavioral: Negative for self-injury, sleep disturbance and suicidal ideas. Objective   Physical Exam   PHYSICAL EXAM:   · Constitutional: Darrell Lopez is oriented to person, place, and time. Vital signs are normal. Appears well-developed and well-nourished. · Head: Normocephalic and atraumatic. Eyes:PERRL, EOMI, Conjunctiva normal, No discharge. · Neck: Full passive range of motion. Non-tender on palpation. Neck supple. No thyromegaly present. Trachea normal.  · Cardiovascular: Normal rate, regular rhythm, S1, S2, no murmur, no gallop, no friction rub, intact distal pulses. · Pulmonary/Chest: Breath sounds are clear throughout, No respiratory distress, No wheezing, No chest tenderness. Effort normal  · Musculoskeletal: Extremities appear regular and symmetric. No evident masses, lesions, foreign bodies, or other abnormalities. No edema. No tenderness on palpation. Joints are stable. Full ROM, strength and tone are within normal limits. · Neurological: Alert and oriented to person, place, and time. Normal motor function, Normal sensory function, No focal deficits noted. He has normal strength. · Skin: Skin is warm, dry and intact. No obvious lesions on exposed skin  · Psychiatric: Normal mood and affect. Speech is normal and behavior is normal.     Nursing note and vitals reviewed. Blood pressure 138/82, pulse 92, temperature 97.3 °F (36.3 °C), temperature source Temporal, weight 164 lb (74.4 kg), SpO2 99 %, not currently breastfeeding. Body mass index is 33.12 kg/m².     Wt Readings from Last 3 Encounters:   07/08/21 164 lb (74.4 kg)   05/04/21 163 lb (73.9 kg)   03/12/21 170 lb (77.1 kg)     BP Readings from Last 3 Encounters:   07/08/21 138/82   05/04/21 138/64   03/12/21 (!) 140/80       Hospital Outpatient Visit on 01/06/2021   Component Date Value Ref Range Status    WBC 01/06/2021 6.6  3.5 - 11.3 k/uL Final    RBC 01/06/2021 4.68  3.95 - 5.11 m/uL Final    Hemoglobin 01/06/2021 13.2  11.9 - 15.1 g/dL Final    Hematocrit 01/06/2021 42.7  36.3 - 47.1 % Final    MCV 01/06/2021 91.2  82.6 - 102.9 fL Final    MCH 01/06/2021 28.2  25.2 - 33.5 pg Final    MCHC 01/06/2021 30.9  28.4 - 34.8 g/dL Final    RDW 01/06/2021 13.7  11.8 - 14.4 % Final    Platelets 35/93/3464 282  138 - 453 k/uL Final    MPV 01/06/2021 11.3  8.1 - 13.5 fL Final    NRBC Automated 01/06/2021 0.0  0.0 per 100 WBC Final    Differential Type 01/06/2021 NOT REPORTED   Final    Seg Neutrophils 01/06/2021 39  36 - 65 % Final    Lymphocytes 01/06/2021 52* 24 - 43 % Final    Monocytes 01/06/2021 7  3 - 12 % Final    Eosinophils % 01/06/2021 1  1 - 4 % Final    Basophils 01/06/2021 1  0 - 2 % Final    Immature Granulocytes 01/06/2021 0  0 % Final    Segs Absolute 01/06/2021 2.62  1.50 - 8.10 k/uL Final    Absolute Lymph # 01/06/2021 3.50  1.10 - 3.70 k/uL Final    Absolute Mono # 01/06/2021 0.43  0.10 - 1.20 k/uL Final    Absolute Eos # 01/06/2021 0.03  0.00 - 0.44 k/uL Final    Basophils Absolute 01/06/2021 0.04  0.00 - 0.20 k/uL Final    Absolute Immature Granulocyte 01/06/2021 <0.03  0.00 - 0.30 k/uL Final    WBC Morphology 01/06/2021 NOT REPORTED   Final    RBC Morphology 01/06/2021 NOT REPORTED   Final    Platelet Estimate 39/05/7353 NOT REPORTED   Final    Glucose 01/06/2021 91  70 - 99 mg/dL Final    BUN 01/06/2021 10  8 - 23 mg/dL Final    CREATININE 01/06/2021 0.75  0.50 - 0.90 mg/dL Final    Bun/Cre Ratio 01/06/2021 NOT REPORTED  9 - 20 Final    Calcium 01/06/2021 9.7  8.6 - 10.4 mg/dL Final    Sodium 01/06/2021 142  135 - 144 mmol/L Final    Potassium 01/06/2021 4.6  3.7 - 5.3 mmol/L Final    Chloride 01/06/2021 106  98 - 107 mmol/L Final    CO2 01/06/2021 22  20 - 31 mmol/L Final    Anion Gap 01/06/2021 14  9 - 17 mmol/L Final    Alkaline Phosphatase 01/06/2021 54  35 - 104 U/L Final    ALT 01/06/2021 10  5 - 33 U/L Final    AST 01/06/2021 16  <32 U/L Final    Total Bilirubin 01/06/2021 0.61  0.3 - 1.2 mg/dL Final    Total Protein 01/06/2021 7.5  6.4 - 8.3 g/dL Final    Albumin 01/06/2021 4.5  3.5 - 5.2 g/dL Final    Albumin/Globulin Ratio 01/06/2021 1.5  1.0 - 2.5 Final    GFR Non- 01/06/2021 >60  >60 mL/min Final    GFR  01/06/2021 >60  >60 mL/min Final    GFR Comment 01/06/2021        Final    Comment: Average GFR for 61-76 years old:   80 mL/min/1.73sq m  Chronic Kidney Disease:   <60 mL/min/1.73sq m  Kidney failure:   <15 mL/min/1.73sq m              eGFR calculated using average adult body mass.  Additional eGFR calculator available at:        [employer]            GFR Staging 01/06/2021 NOT REPORTED   Final adherence  2. Patient given educational materials - see patient instructions  3. Was a self-tracking handout given in paper form or via [employer]? No  If yes, see orders or list here. 4.  Discussed use, benefit, and side effects of prescribed medications. Barriers to medication compliance addressed. All patient questions answered. Pt voiced understanding. 5.  Reviewed prior labs, imaging, consultation, follow up, and health maintenance  6. Continue current medications, diet and exercise. 7. Discussed use, benefit, and side effects of prescribed medications. Barriers to medication compliance addressed. All her questions were answered. Pt voiced understanding. Ellen Quinonez will continue current medications, diet and exercise. Patient given educational materials on Hyperlipidemia    Of the 30 minute duration appointment visit, Malika Lara CNP spent at least 50% of the face-to-face time in counseling, explanation of diagnosis, planning of further management, and answering all questions.           Signed:  Malika Lara CNP

## 2021-07-09 ENCOUNTER — HOSPITAL ENCOUNTER (OUTPATIENT)
Age: 66
Setting detail: SPECIMEN
Discharge: HOME | End: 2021-07-09
Payer: MEDICARE

## 2021-07-09 DIAGNOSIS — E78.2 MIXED HYPERLIPIDEMIA: ICD-10-CM

## 2021-07-09 LAB
CHOLESTEROL, FASTING: 212 MG/DL
CHOLESTEROL/HDL RATIO: 3.2
HDLC SERPL-MCNC: 67 MG/DL
LDL CHOLESTEROL: 129 MG/DL (ref 0–130)
TRIGLYCERIDE, FASTING: 82 MG/DL
VLDLC SERPL CALC-MCNC: ABNORMAL MG/DL (ref 1–30)

## 2021-07-14 ENCOUNTER — TELEPHONE (OUTPATIENT)
Dept: FAMILY MEDICINE CLINIC | Age: 66
End: 2021-07-14

## 2021-07-14 NOTE — TELEPHONE ENCOUNTER
Called patient. Zoloft went to the correct pharmacy.      The confusion was the local [employer] store called her about this but, it didn't go to the local store it went to [facility] [place], [place]

## 2021-08-31 ENCOUNTER — OFFICE VISIT (OUTPATIENT)
Dept: FAMILY MEDICINE CLINIC | Age: 66
End: 2021-08-31
Payer: MEDICARE

## 2021-08-31 VITALS
BODY MASS INDEX: 32.86 KG/M2 | OXYGEN SATURATION: 97 % | DIASTOLIC BLOOD PRESSURE: 88 MMHG | WEIGHT: 163 LBS | SYSTOLIC BLOOD PRESSURE: 122 MMHG | HEART RATE: 96 BPM | TEMPERATURE: 97.7 F | HEIGHT: 59 IN

## 2021-08-31 DIAGNOSIS — Z78.0 POST-MENOPAUSAL: ICD-10-CM

## 2021-08-31 DIAGNOSIS — M85.80 OSTEOPENIA, UNSPECIFIED LOCATION: Primary | ICD-10-CM

## 2021-08-31 PROBLEM — S83.242A TEAR OF MEDIAL MENISCUS OF LEFT KNEE, CURRENT: Status: RESOLVED | Noted: 2020-02-05 | Resolved: 2021-08-31

## 2021-08-31 PROCEDURE — 99214 OFFICE O/P EST MOD 30 MIN: CPT | Performed by: NURSE PRACTITIONER

## 2021-08-31 RX ORDER — ALENDRONATE SODIUM 70 MG/1
TABLET ORAL
Qty: 12 TABLET | Refills: 1 | Status: CANCELLED | OUTPATIENT
Start: 2021-08-31

## 2021-08-31 NOTE — PROGRESS NOTES
[place] [address]  [id number]  [facility], [address]  [phone]  [phone]    Chico Bell is a 73 y.o. female who is here with c/o of:    Chief Complaint: Annual Exam      Patient Accompanied by: n/a    HPI - Chico Bell is here today with c/o:    Patient here for annual physical.     : Yes  Children: No  Employed: Retired  Exercise: No   Diet: Tries to eat a balanced diet  Smoker: Yes; e cig with nicotine  Alcohol: No  Sleep: Averages 8 hours intermittently     Chronic Conditions:    Osteopenia  Hypothyroid  Anxiety and depression  Hyperlipidemia    Specialists:    Endo    Health Concerns: There are no preventive care reminders to display for this patient.      Patient Active Problem List:     Osteopenia     FH: breast cancer in first degree relative     Hx of thyroid nodule     Hypothyroidism     Electronic cigarette use     Vaginal dryness, menopausal     Urgency of urination     Anxiety and depression     Primary osteoarthritis of right knee     Tear of medial meniscus of left knee, current     Mixed hyperlipidemia     Past Medical History:   Diagnosis Date    Anxiety     Arthritis     Depression     Fracture     Hyperlipidemia     Hypothyroidism     Thyroid disease     Dr. Cady Rose      Past Surgical History:   Procedure Laterality Date    ANKLE SURGERY      APPENDECTOMY      HYSTERECTOMY      HYSTERECTOMY, TOTAL ABDOMINAL       Family History   Problem Relation Age of Onset    Breast Cancer Mother     Heart Attack Mother     Heart Attack Sister     Other Brother         HIV     Social History     Tobacco Use    Smoking status: Former Smoker     Packs/day: 0.50     Years: 38.00     Pack years: 19.00     Types: Cigarettes     Quit date: 3/13/2013     Years since quittin.5    Smokeless tobacco: Never Used    Tobacco comment: uses electric cig   Substance Use Topics    Alcohol use: No     ALLERGIES:  No Known Allergies Subjective   Review of Systems   · Constitutional:  Negative for activity change, appetite change,unexpected weight change, chills, fever, and fatigue. · HENT: Negative for ear pain, sore throat,  Rhinorrhea, sinus pain, sinus pressure, congestion. · Eyes:  Negative for pain and discharge. · Respiratory:  Negative for chest tightness, shortness of breath, wheezing, and cough. · Cardiovascular:  Negative for chest pain, palpitations and leg swelling. · Gastrointestinal: Negative for abdominal pain, blood in stool, constipation,diarrhea, nausea and vomiting. · Endocrine: Negative for cold intolerance, heat intolerance, polydipsia, polyphagia and polyuria. · Genitourinary: Negative for difficulty urinating, dysuria, flank pain, frequency, hematuria and urgency. · Musculoskeletal: Negative for arthralgias, back pain, joint swelling, myalgias, neck pain and neck stiffness. · Skin: Negative for rash and wound. · Allergic/Immunologic: Negative for environmental allergies and food allergies. · Neurological:  Negative for dizziness, light-headedness, numbness and headaches. · Hematological:  Negative for adenopathy. Does not bruise/bleed easily. · Psychiatric/Behavioral: Negative for self-injury, sleep disturbance and suicidal ideas. Objective   Physical Exam   PHYSICAL EXAM:   · Constitutional: Maryanne Brunner is oriented to person, place, and time. Vital signs are normal. Appears well-developed and well-nourished. · HEENT:   · Head: Normocephalic and atraumatic. Right Ear: Hearing and external ear normal. TM normal  Canal normal  · Left Ear: Hearing and external ear normal. TM normal Canal normal  · Nose: Nares normal. Septum midline. No drainage or sinus tenderness. Mucosa pink and moist  · Mouth/Throat: Oropharynx- No erythema, no exudate. Uvula midline, no erythema, no edema. Mucous membranes are pink and moist.   · Eyes:PERRL, EOMI, Conjunctiva normal, No discharge.     · Neck: Full passive range of motion. Non-tender on palpation. Neck supple. No thyromegaly present. Trachea normal.  · Cardiovascular: Normal rate, regular rhythm, S1, S2, no murmur, no gallop, no friction rub, intact distal pulses. · Pulmonary/Chest: Breath sounds are clear throughout, No respiratory distress, No wheezing, No chest tenderness. Effort normal  · Abdominal: Soft. Normal appearance, bowel sounds are present and normoactive. There is no hepatosplenomegaly. There is no tenderness. There is no CVA tenderness. · Musculoskeletal: Extremities appear regular and symmetric. No evident masses, lesions, foreign bodies, or other abnormalities. No edema. No tenderness on palpation. Joints are stable. Full ROM, strength and tone are within normal limits. · Lymphadenopathy: No lymphadenopathy noted. · Neurological: Alert and oriented to person, place, and time. Normal motor function, Normal sensory function, No focal deficits noted. He has normal strength. · Skin: Skin is warm, dry and intact. No obvious lesions on exposed skin  · Psychiatric: Normal mood and affect. Speech is normal and behavior is normal.     Nursing note and vitals reviewed. Blood pressure 122/88, pulse 96, temperature 97.7 °F (36.5 °C), temperature source Temporal, height 4' 11\" (1.499 m), weight 163 lb (73.9 kg), SpO2 97 %, not currently breastfeeding. Body mass index is 32.92 kg/m².     Wt Readings from Last 3 Encounters:   09/09/21 161 lb (73 kg)   08/31/21 163 lb (73.9 kg)   07/08/21 164 lb (74.4 kg)     BP Readings from Last 3 Encounters:   09/09/21 130/84   08/31/21 122/88   07/08/21 138/82       Hospital Outpatient Visit on 07/09/2021   Component Date Value Ref Range Status    Cholesterol, Fasting 07/09/2021 212* <200 mg/dL Final    Comment:    Cholesterol Guidelines:      <200  Desirable   200-240  Borderline      >240  Undesirable         HDL 07/09/2021 67  >40 mg/dL Final    Comment:    HDL Guidelines:    <40     Undesirable   40-59 Borderline    >59     Desirable         LDL Cholesterol 07/09/2021 129  0 - 130 mg/dL Final    Comment:    LDL Guidelines:     <100    Desirable   100-129   Near to/above Desirable   130-159   Borderline      >159   Undesirable     Direct (measured) LDL and calculated LDL are not interchangeable tests.  Chol/HDL Ratio 07/09/2021 3.2  <5 Final            Triglyceride, Fasting 07/09/2021 82  <150 mg/dL Final    Comment:    Triglyceride Guidelines:     <150   Desirable   150-199  Borderline   200-499  High     >499   Very high   Based on AHA Guidelines for fasting triglyceride, October 2012.  VLDL 07/09/2021 NOT REPORTED  1 - 30 mg/dL Final     No results found for this visit on 08/31/21. Completed Orders/Prescriptions   No orders of the defined types were placed in this encounter. AssessmentPlan/Medical Decision Making     1. Annual physical exam    - HM utd  - Labs utd  - Covid vaccine received     2. Osteopenia, unspecified location    - Continue Fosamax and OTC calcium and vitamin d    3. Post-menopausal    - DEXA BONE DENSITY AXIAL SKELETON; Future      Return for as scheduled. 1.  Kevin Rae received counseling on the following healthy behaviors: nutrition, exercise and medication adherence  2. Patient given educational materials - see patient instructions  3. Was a self-tracking handout given in paper form or via [employer]? No  If yes, see orders or list here. 4.  Discussed use, benefit, and side effects of prescribed medications. Barriers to medication compliance addressed. All patient questions answered. Pt voiced understanding. 5.  Reviewed prior labs, imaging, consultation, follow up, and health maintenance  6. Continue current medications, diet and exercise. 7. Discussed use, benefit, and side effects of prescribed medications. Barriers to medication compliance addressed. All her questions were answered. Pt voiced understanding.  Kevin Rae will continue current medications, diet and exercise. Patient given educational materials on well visit    Of the 30 minute duration appointment visit, Adali Machado CNP spent at least 50% of the face-to-face time in counseling, explanation of diagnosis, planning of further management, and answering all questions.           Signed:  Adali Machado CNP

## 2021-08-31 NOTE — PATIENT INSTRUCTIONS
Patient Education        Well Visit, Women 48 to 72: Care Instructions  Overview     Well visits can help you stay healthy. Your doctor has checked your overall health and may have suggested ways to take good care of yourself. Your doctor also may have recommended tests. At home, you can help prevent illness with healthy eating, regular exercise, and other steps. Follow-up care is a key part of your treatment and safety. Be sure to make and go to all appointments, and call your doctor if you are having problems. It's also a good idea to know your test results and keep a list of the medicines you take. How can you care for yourself at home? · Get screening tests that you and your doctor decide on. Screening helps find diseases before any symptoms appear. · Eat healthy foods. Choose fruits, vegetables, whole grains, protein, and low-fat dairy foods. Limit fat, especially saturated fat. Reduce salt in your diet. · Limit alcohol. Have no more than 1 drink a day or 7 drinks a week. · Get at least 30 minutes of exercise on most days of the week. Walking is a good choice. You also may want to do other activities, such as running, swimming, cycling, or playing tennis or team sports. · Reach and stay at a healthy weight. This will lower your risk for many problems, such as obesity, diabetes, heart disease, and high blood pressure. · Do not smoke. Smoking can make health problems worse. If you need help quitting, talk to your doctor about stop-smoking programs and medicines. These can increase your chances of quitting for good. · Care for your mental health. It is easy to get weighed down by worry and stress. Learn strategies to manage stress, like deep breathing and mindfulness, and stay connected with your family and community. If you find you often feel sad or hopeless, talk with your doctor. Treatment can help.   · Talk to your doctor about whether you have any risk factors for sexually transmitted infections (STIs). You can help prevent STIs if you wait to have sex with a new partner (or partners) until you've each been tested for STIs. It also helps if you use condoms (male or female condoms) and if you limit your sex partners to one person who only has sex with you. Vaccines are available for some STIs. · If you think you may have a problem with alcohol or drug use, talk to your doctor. This includes prescription medicines (such as amphetamines and opioids) and illegal drugs (such as cocaine and methamphetamine). Your doctor can help you figure out what type of treatment is best for you. · Protect your skin from too much sun. When you're outdoors from 10 a.m. to 4 p.m., stay in the shade or cover up with clothing and a hat with a wide brim. Wear sunglasses that block UV rays. Even when it's cloudy, put broad-spectrum sunscreen (SPF 30 or higher) on any exposed skin. · See a dentist one or two times a year for checkups and to have your teeth cleaned. · Wear a seat belt in the car. When should you call for help? Watch closely for changes in your health, and be sure to contact your doctor if you have any problems or symptoms that concern you. Where can you learn more? Go to https://[employer]MediaScrapepartners. org and sign in to your [employer] account. Enter Y621 in the [facility] box to learn more about \"Well Visit, Women 50 to 72: Care Instructions. \"     If you do not have an account, please click on the \"Sign Up Now\" link. Current as of: May 27, 2020               Content Version: 12.9  © 9849-0275 [employer], [employer]. Care instructions adapted under license by [facility] ([facility]). If you have questions about a medical condition or this instruction, always ask your healthcare professional. [place] [address] any warranty or liability for your use of this information.

## 2021-09-09 ENCOUNTER — OFFICE VISIT (OUTPATIENT)
Dept: DERMATOLOGY | Age: 66
End: 2021-09-09
Payer: MEDICARE

## 2021-09-09 VITALS
BODY MASS INDEX: 32.46 KG/M2 | HEIGHT: 59 IN | HEART RATE: 94 BPM | TEMPERATURE: 93.2 F | OXYGEN SATURATION: 98 % | DIASTOLIC BLOOD PRESSURE: 84 MMHG | WEIGHT: 161 LBS | SYSTOLIC BLOOD PRESSURE: 130 MMHG

## 2021-09-09 DIAGNOSIS — L60.1 ONYCHOLYSIS: Primary | ICD-10-CM

## 2021-09-09 PROCEDURE — 1036F TOBACCO NON-USER: CPT | Performed by: DERMATOLOGY

## 2021-09-09 PROCEDURE — G8399 PT W/DXA RESULTS DOCUMENT: HCPCS | Performed by: DERMATOLOGY

## 2021-09-09 PROCEDURE — 1123F ACP DISCUSS/DSCN MKR DOCD: CPT | Performed by: DERMATOLOGY

## 2021-09-09 PROCEDURE — G8417 CALC BMI ABV UP PARAM F/U: HCPCS | Performed by: DERMATOLOGY

## 2021-09-09 PROCEDURE — 4040F PNEUMOC VAC/ADMIN/RCVD: CPT | Performed by: DERMATOLOGY

## 2021-09-09 PROCEDURE — 3017F COLORECTAL CA SCREEN DOC REV: CPT | Performed by: DERMATOLOGY

## 2021-09-09 PROCEDURE — 99204 OFFICE O/P NEW MOD 45 MIN: CPT | Performed by: DERMATOLOGY

## 2021-09-09 PROCEDURE — G8427 DOCREV CUR MEDS BY ELIG CLIN: HCPCS | Performed by: DERMATOLOGY

## 2021-09-09 PROCEDURE — 1090F PRES/ABSN URINE INCON ASSESS: CPT | Performed by: DERMATOLOGY

## 2021-09-09 RX ORDER — CLOBETASOL PROPIONATE 0.5 MG/G
OINTMENT TOPICAL
Qty: 15 G | Refills: 2 | Status: SHIPPED | OUTPATIENT
Start: 2021-09-09 | End: 2022-07-12

## 2021-09-09 NOTE — PROGRESS NOTES
Dermatology Patient Note  [facility] [address]  [address] [facility] [address]  Dept: [phone]  Dept Fax: [phone]: 9/9/2021   REFERRING PROVIDER: ИВАН Butts - *      An Craft is a 73 y.o. female  who presents today in the office for:    New Patient (onycholysis right index finger, patient was first diagnosed with this condition in early 2021. PT has not had any treatment for this yet. PT just wants to know what can be done for her. PT use to frequently wear acrylic nails, causing damage to her nail beds so she has since stopped. Her nails, however continue to chip and break off) and Other (No personal/FH of skin cancer)      HISTORY OF PRESENT ILLNESS:  As above. Patient states that her nail was lifting and continues to break. Reports that this has been like this for months. Patient admits to picking at it .     MEDICAL PROBLEMS:  Patient Active Problem List    Diagnosis Date Noted    Vaginal dryness, menopausal 09/02/2015     Priority: High     Started on vaginal estradiol      Electronic cigarette use 08/27/2014     Priority: High     (9/2015) Continues use      Osteopenia 08/12/2013     Priority: High     On Fosamax  Last DEXA scan 10/2015      Hypothyroidism 10/10/2013     Priority: Medium     Followed by endocrinology      FH: breast cancer in first degree relative 08/12/2013     Priority: Medium    Hx of thyroid nodule 08/12/2013     Priority: Medium     Followed by endocrinology      Mixed hyperlipidemia 07/08/2021    Anxiety and depression 01/06/2021    Primary osteoarthritis of right knee 10/17/2019       CURRENT MEDICATIONS:   Current Outpatient Medications   Medication Sig Dispense Refill    sertraline (ZOLOFT) 50 MG tablet Take 1 tablet by mouth daily 90 tablet 1    alendronate (FOSAMAX) 70 MG tablet TAKE 1 TABLET BY MOUTH EVERY 7 DAYS 12 tablet 1    levothyroxine (SYNTHROID) 88 MCG tablet Take 1 tablet by mouth daily 90 tablet 0    ibuprofen (ADVIL;MOTRIN) 800 MG tablet TAKE 1 TABLET BY MOUTH 3 TIMES DAILY WITH MEALS 90 tablet 0     No current facility-administered medications for this visit. ALLERGIES:   No Known Allergies    SOCIAL HISTORY:  Social History     Tobacco Use    Smoking status: Former Smoker     Packs/day: 0.50     Years: 38.00     Pack years: 19.00     Types: Cigarettes     Quit date: 3/13/2013     Years since quittin.4    Smokeless tobacco: Never Used    Tobacco comment: uses electric cig   Substance Use Topics    Alcohol use: No       Pertinent ROS:  Review of Systems  Skin: Denies any new changing, growing or bleeding lesions or rashes except as described in the HPI   Constitutional: Denies fevers, chills, and malaise. PHYSICAL EXAM:   /84 (Site: Left Upper Arm, Position: Sitting, Cuff Size: Medium Adult)   Pulse 94   Temp 93.2 °F (34 °C) (Temporal)   Ht 4' 11\" (1.499 m)   Wt 161 lb (73 kg)   LMP  (LMP Unknown)   SpO2 98%   Breastfeeding No   BMI 32.52 kg/m²     The patient is generally well appearing, well nourished, alert and conversational. Affect is normal.    Cutaneous Exam:  Physical Exam  Focused exam of nails was performed    Facial covering was not removed during examination. Diagnoses/exam findings/medical history pertinent to this visit are listed below:    Assessment:   Diagnosis Orders   1.  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          Plan:  Onycholysis and onychodystrophy, right index finger  - chronic illness with progression and/or exacerbation  - KOH negative  - likely chemical or trauma induced (acrylics +/- picking)  - discussed diagnosis, etiology, natural course, and treatment options   - apply clobetasol ointment to nail bed  - try to avoid picking at the nail  - would not currently recommend avulsion, may consider in future    RTC 3 months     Future Appointments   Date Time Provider Chilo Cruz   10/7/2021  8:00 AM RONAL VILLEDA [facility]   1/12/2022  9:00 AM Cisco Seay, APRN - CNP [facility]         There are no Patient Instructions on file for this visit. This note was created with the assistance of a speech-recognition program.  Although the intention is to generate a document that actually reflects the content of the visit, no guarantees can be provided that every mistake has been identified and corrected by editing. I, Dr. Carleen Elder, personally performed the services described in this documentation, as scribed by [facility] in my presence, and it is both accurate and complete.      Electronically signed by Diego Lieberman MD on 9/9/21 at 11:18 AM EDT

## 2021-09-24 ENCOUNTER — IMMUNIZATION (OUTPATIENT)
Dept: FAMILY MEDICINE CLINIC | Age: 66
End: 2021-09-24
Payer: MEDICARE

## 2021-09-24 PROCEDURE — 90694 VACC AIIV4 NO PRSRV 0.5ML IM: CPT | Performed by: NURSE PRACTITIONER

## 2021-09-24 PROCEDURE — G0008 ADMIN INFLUENZA VIRUS VAC: HCPCS | Performed by: NURSE PRACTITIONER

## 2021-09-30 ENCOUNTER — TELEPHONE (OUTPATIENT)
Dept: FAMILY MEDICINE CLINIC | Age: 66
End: 2021-09-30

## 2021-09-30 NOTE — TELEPHONE ENCOUNTER
----- Message from Torin Green sent at 9/30/2021  9:47 AM EDT -----  Subject: Message to Provider    QUESTIONS  Information for Provider? The patient had an appt on 8/31/2021 and its   under annual physical, but on 5/4/2021 she had a Medicare annual wellness   visit so she needs the one on 8/31 changed to preventative care so that it   will go through her insurance properly. Requesting call back when changed. ---------------------------------------------------------------------------  --------------  Daryle Haver INFO  What is the best way for the office to contact you? OK to leave message on   voicemail  Preferred Call Back Phone Number? [phone]  ---------------------------------------------------------------------------  --------------  SCRIPT ANSWERS  Relationship to Patient?  Self

## 2021-09-30 NOTE — TELEPHONE ENCOUNTER
Called and left detailed message for patient letting her know that Rolanda Sheetsr corrected the codes and we will resubmit for billing.

## 2021-09-30 NOTE — TELEPHONE ENCOUNTER
Patient called and said he 08/31/21 visit should not be a physical. She has an AWV in May. She is being charged for the visit that the insurance is denying due to the code. She said it should be under preventative and not physical. Please advise.

## 2021-09-30 NOTE — TELEPHONE ENCOUNTER
----- Message from Mando Rhoades sent at 9/30/2021  9:47 AM EDT -----  Subject: Message to Provider    QUESTIONS  Information for Provider? The patient had an appt on 8/31/2021 and its   under annual physical, but on 5/4/2021 she had a Medicare annual wellness   visit so she needs the one on 8/31 changed to preventative care so that it   will go through her insurance properly. Requesting call back when changed. ---------------------------------------------------------------------------  --------------  Sapphire GUTIERREZ  What is the best way for the office to contact you? OK to leave message on   voicemail  Preferred Call Back Phone Number? [phone]  ---------------------------------------------------------------------------  --------------  SCRIPT ANSWERS  Relationship to Patient?  Self

## 2021-10-07 ENCOUNTER — HOSPITAL ENCOUNTER (OUTPATIENT)
Dept: MAMMOGRAPHY | Age: 66
Discharge: HOME | End: 2021-10-09
Payer: MEDICARE

## 2021-10-07 DIAGNOSIS — Z78.0 POST-MENOPAUSAL: ICD-10-CM

## 2021-10-07 PROCEDURE — 77080 DXA BONE DENSITY AXIAL: CPT

## 2021-10-08 DIAGNOSIS — E78.89 LIPIDS ABNORMAL: Primary | ICD-10-CM

## 2021-10-25 DIAGNOSIS — E03.9 HYPOTHYROIDISM, UNSPECIFIED TYPE: ICD-10-CM

## 2021-10-25 RX ORDER — LEVOTHYROXINE SODIUM 88 UG/1
88 TABLET ORAL DAILY
Qty: 90 TABLET | Refills: 1 | Status: SHIPPED | OUTPATIENT
Start: 2021-10-25 | End: 2021-12-03 | Stop reason: SDUPTHER

## 2021-10-25 NOTE — TELEPHONE ENCOUNTER
Merari Wellington is calling to request a refill on the following medication(s):    Medication Request:  Requested Prescriptions     Pending Prescriptions Disp Refills    levothyroxine (SYNTHROID) 88 MCG tablet 90 tablet 1     Sig: Take 1 tablet by mouth daily       Last Visit Date (If Applicable):  1/42/4262    Next Visit Date:    1/12/2022

## 2021-12-03 DIAGNOSIS — M85.80 OSTEOPENIA, UNSPECIFIED LOCATION: ICD-10-CM

## 2021-12-03 DIAGNOSIS — E03.9 HYPOTHYROIDISM, UNSPECIFIED TYPE: ICD-10-CM

## 2021-12-06 RX ORDER — LEVOTHYROXINE SODIUM 88 UG/1
88 TABLET ORAL DAILY
Qty: 90 TABLET | Refills: 1 | Status: SHIPPED | OUTPATIENT
Start: 2021-12-06 | End: 2022-03-17 | Stop reason: SDUPTHER

## 2021-12-06 RX ORDER — ALENDRONATE SODIUM 70 MG/1
TABLET ORAL
Qty: 12 TABLET | Refills: 1 | Status: SHIPPED | OUTPATIENT
Start: 2021-12-06 | End: 2022-04-22 | Stop reason: SDUPTHER

## 2021-12-09 ENCOUNTER — HOSPITAL ENCOUNTER (OUTPATIENT)
Age: 66
Setting detail: SPECIMEN
Discharge: HOME | End: 2021-12-09

## 2021-12-09 ENCOUNTER — OFFICE VISIT (OUTPATIENT)
Dept: DERMATOLOGY | Age: 66
End: 2021-12-09
Payer: MEDICARE

## 2021-12-09 VITALS
WEIGHT: 165 LBS | HEART RATE: 84 BPM | TEMPERATURE: 97.4 F | OXYGEN SATURATION: 98 % | HEIGHT: 59 IN | DIASTOLIC BLOOD PRESSURE: 95 MMHG | BODY MASS INDEX: 33.26 KG/M2 | SYSTOLIC BLOOD PRESSURE: 165 MMHG

## 2021-12-09 DIAGNOSIS — L60.1 ONYCHOLYSIS: Primary | ICD-10-CM

## 2021-12-09 PROCEDURE — 99213 OFFICE O/P EST LOW 20 MIN: CPT | Performed by: DERMATOLOGY

## 2021-12-09 PROCEDURE — 3017F COLORECTAL CA SCREEN DOC REV: CPT | Performed by: DERMATOLOGY

## 2021-12-09 PROCEDURE — 1090F PRES/ABSN URINE INCON ASSESS: CPT | Performed by: DERMATOLOGY

## 2021-12-09 PROCEDURE — 1123F ACP DISCUSS/DSCN MKR DOCD: CPT | Performed by: DERMATOLOGY

## 2021-12-09 PROCEDURE — G8484 FLU IMMUNIZE NO ADMIN: HCPCS | Performed by: DERMATOLOGY

## 2021-12-09 PROCEDURE — G8417 CALC BMI ABV UP PARAM F/U: HCPCS | Performed by: DERMATOLOGY

## 2021-12-09 PROCEDURE — G8427 DOCREV CUR MEDS BY ELIG CLIN: HCPCS | Performed by: DERMATOLOGY

## 2021-12-09 PROCEDURE — G8399 PT W/DXA RESULTS DOCUMENT: HCPCS | Performed by: DERMATOLOGY

## 2021-12-09 PROCEDURE — 4040F PNEUMOC VAC/ADMIN/RCVD: CPT | Performed by: DERMATOLOGY

## 2021-12-09 PROCEDURE — 1036F TOBACCO NON-USER: CPT | Performed by: DERMATOLOGY

## 2021-12-09 NOTE — PROGRESS NOTES
Dermatology Patient Note  [facility] [address]  [address]  [address] [place] [id number]  Dept: [phone]  Dept Fax: [phone]      VISITDATE: 12/9/2021   REFERRING PROVIDER: No ref. provider found      Cristal Martinez is a 73 y.o. female  who presents today in the office for:    Follow-up (. Improved)      HISTORY OF PRESENT ILLNESS:  As above. Patient states that the nail is lightening up and looks better. Patient does not clip nail back but files it down. Patient denies digging under nail. MEDICAL PROBLEMS:  Patient Active Problem List    Diagnosis Date Noted    Vaginal dryness, menopausal 09/02/2015     Priority: High     Started on vaginal estradiol      Electronic cigarette use 08/27/2014     Priority: High     (9/2015) Continues use      Osteopenia 08/12/2013     Priority: High     On Fosamax  Last DEXA scan 10/2015      Hypothyroidism 10/10/2013     Priority: Medium     Followed by endocrinology      FH: breast cancer in first degree relative 08/12/2013     Priority: Medium    Hx of thyroid nodule 08/12/2013     Priority: Medium     Followed by endocrinology      Mixed hyperlipidemia 07/08/2021    Anxiety and depression 01/06/2021    Primary osteoarthritis of right knee 10/17/2019       CURRENT MEDICATIONS:   Current Outpatient Medications   Medication Sig Dispense Refill    alendronate (FOSAMAX) 70 MG tablet TAKE 1 TABLET BY MOUTH EVERY 7 DAYS 12 tablet 1    levothyroxine (SYNTHROID) 88 MCG tablet Take 1 tablet by mouth daily 90 tablet 1    clobetasol (TEMOVATE) 0.05 % ointment Apply to nailbed twice daily 15 g 2    sertraline (ZOLOFT) 50 MG tablet Take 1 tablet by mouth daily 90 tablet 1    ibuprofen (ADVIL;MOTRIN) 800 MG tablet TAKE 1 TABLET BY MOUTH 3 TIMES DAILY WITH MEALS 90 tablet 0     No current facility-administered medications for this visit.        ALLERGIES:   No Known Allergies    SOCIAL HISTORY:  Social History     Tobacco Use    Smoking status: Former Smoker     Packs/day: 0.50     Years: 38.00     Pack years: 19.00     Types: Cigarettes     Quit date: 3/13/2013     Years since quittin.7    Smokeless tobacco: Never Used    Tobacco comment: uses electric cig   Substance Use Topics    Alcohol use: No       Pertinent ROS:  Review of Systems  Skin: Denies any new changing, growing or bleeding lesions or rashes except as described in the HPI   Constitutional: Denies fevers, chills, and malaise. PHYSICAL EXAM:   BP (!) 165/95   Pulse 84   Temp 97.4 °F (36.3 °C)   Ht 4' 11\" (1.499 m)   Wt 165 lb (74.8 kg)   LMP  (LMP Unknown)   SpO2 98%   BMI 33.33 kg/m²     The patient is generally well appearing, well nourished, alert and conversational. Affect is normal.    Cutaneous Exam:  Physical Exam  Focused exam of right hand was performed    Facial covering was not removed during examination. Diagnoses/exam findings/medical history pertinent to this visit are listed below:    Assessment:   Diagnosis Orders   1. Onycholysis          Plan:  Onycholysis and onychodystrophy, right index finger  - discussed diagnosis, etiology, natural course, and treatment options   - suspect secondary or primary onychomycosis  - discussed terbenafine with patient if fungal culture comes back positive  - Discussed risks of taste disturbance and liver inflammation including signs and symptoms of liver inflammation. Patient to call if she has any problems while on the medication. - discussed with patient to clip nail back to nail bed to allow new nail growth to attach the nail bed  - previously KOH negative  - PAS stain of clipping today  - clipped nail back today    RTC 4 months    Future Appointments   Date Time Provider Chilo Cruz   2022  9:00 AM Tonya Bob APRN - CNP [facility] [address]         There are no Patient Instructions on file for this visit.     This note was created with the assistance of a speech-recognition program.  Although the intention is to generate a document that actually reflects the content of the visit, no guarantees can be provided that every mistake has been identified and corrected by editing. I, Dr. Ophelia Shane, personally performed the services described in this documentation, as scribed by Casper Grewal in my presence, and it is both accurate and complete.     Electronically signed by Sumit Rowan MD on 12/9/21 at 8:35 AM EST

## 2021-12-09 NOTE — PATIENT INSTRUCTIONS
Onychomycosis (toenail fungus)    Your exam and/or laboratory tests support a diagnosis of onychomycosis. The best and most effective way of treating this is with an oral antifungal medicine taken daily for 3 months. The medication is called terbinafine. I will prescribe 250 mg to be taken daily. The medicine is quite safe but there are rare side effects of severe drug reactions and liver injury which cannot be predicted with blood tests. The exact incidence of clinically apparent liver injury caused by terbinafine is unknown, but is estimated to be between 1 in 50,000 to 120,000 prescriptions. Stop terbinafine immediately and seek medical care should any of the following occur while on therapy: Yellowing of the skin or eyes, flu-like symptoms (including fatigue, muscle aches, joint aches, and lack of appetite), dark urine or pale stools, itching, nausea and/or vomiting, abdominal pain and/or discomfort including diarrhea, weight loss, redness of the hands. You should anticipate seeing normal nails growing from the base of the nail outward after several weeks of therapy. Fingernails take approximately one year to grow whereas toenails will take approximately a year and a half to grow, so normal fingernails and toenails will not be seen until after this time. Once you have completed the terbinafine therapy, I would encourage you to begin an antifungal cream (over the counter Lotrimin or Lamisil or the generic equivalent). This will help prevent recurrence of fungal infection of your nails. Do not hesitate to let me know if you have any questions or concerns.

## 2021-12-13 DIAGNOSIS — B35.1 ONYCHOMYCOSIS: Primary | ICD-10-CM

## 2021-12-13 LAB — DERMATOLOGY PATHOLOGY REPORT: NORMAL

## 2021-12-13 RX ORDER — TERBINAFINE HYDROCHLORIDE 250 MG/1
250 TABLET ORAL DAILY
Qty: 84 TABLET | Refills: 0 | Status: SHIPPED | OUTPATIENT
Start: 2021-12-13 | End: 2022-03-07

## 2021-12-13 NOTE — RESULT ENCOUNTER NOTE
Clipping showed nail fungus. Stop the clobetasol, continue to clip back detached nail, and start terbinafine 250 mg daily x 3 months, as we discussed.

## 2022-01-12 ENCOUNTER — OFFICE VISIT (OUTPATIENT)
Dept: FAMILY MEDICINE CLINIC | Age: 67
End: 2022-01-12
Payer: MEDICARE

## 2022-01-12 VITALS
TEMPERATURE: 96.8 F | SYSTOLIC BLOOD PRESSURE: 124 MMHG | DIASTOLIC BLOOD PRESSURE: 82 MMHG | BODY MASS INDEX: 32.72 KG/M2 | HEART RATE: 103 BPM | WEIGHT: 162 LBS | OXYGEN SATURATION: 98 %

## 2022-01-12 DIAGNOSIS — E03.9 HYPOTHYROIDISM, UNSPECIFIED TYPE: ICD-10-CM

## 2022-01-12 DIAGNOSIS — F41.9 ANXIETY AND DEPRESSION: ICD-10-CM

## 2022-01-12 DIAGNOSIS — M85.80 OSTEOPENIA, UNSPECIFIED LOCATION: Primary | ICD-10-CM

## 2022-01-12 DIAGNOSIS — E78.2 MIXED HYPERLIPIDEMIA: ICD-10-CM

## 2022-01-12 DIAGNOSIS — F32.A ANXIETY AND DEPRESSION: ICD-10-CM

## 2022-01-12 PROCEDURE — 1123F ACP DISCUSS/DSCN MKR DOCD: CPT | Performed by: NURSE PRACTITIONER

## 2022-01-12 PROCEDURE — 3017F COLORECTAL CA SCREEN DOC REV: CPT | Performed by: NURSE PRACTITIONER

## 2022-01-12 PROCEDURE — G8399 PT W/DXA RESULTS DOCUMENT: HCPCS | Performed by: NURSE PRACTITIONER

## 2022-01-12 PROCEDURE — 99214 OFFICE O/P EST MOD 30 MIN: CPT | Performed by: NURSE PRACTITIONER

## 2022-01-12 PROCEDURE — 4040F PNEUMOC VAC/ADMIN/RCVD: CPT | Performed by: NURSE PRACTITIONER

## 2022-01-12 PROCEDURE — 1036F TOBACCO NON-USER: CPT | Performed by: NURSE PRACTITIONER

## 2022-01-12 PROCEDURE — G8484 FLU IMMUNIZE NO ADMIN: HCPCS | Performed by: NURSE PRACTITIONER

## 2022-01-12 PROCEDURE — 1090F PRES/ABSN URINE INCON ASSESS: CPT | Performed by: NURSE PRACTITIONER

## 2022-01-12 PROCEDURE — G8427 DOCREV CUR MEDS BY ELIG CLIN: HCPCS | Performed by: NURSE PRACTITIONER

## 2022-01-12 PROCEDURE — G8417 CALC BMI ABV UP PARAM F/U: HCPCS | Performed by: NURSE PRACTITIONER

## 2022-01-12 SDOH — ECONOMIC STABILITY: TRANSPORTATION INSECURITY
IN THE PAST 12 MONTHS, HAS THE LACK OF TRANSPORTATION KEPT YOU FROM MEDICAL APPOINTMENTS OR FROM GETTING MEDICATIONS?: NO

## 2022-01-12 SDOH — ECONOMIC STABILITY: FOOD INSECURITY: WITHIN THE PAST 12 MONTHS, YOU WORRIED THAT YOUR FOOD WOULD RUN OUT BEFORE YOU GOT MONEY TO BUY MORE.: NEVER TRUE

## 2022-01-12 SDOH — ECONOMIC STABILITY: FOOD INSECURITY: WITHIN THE PAST 12 MONTHS, THE FOOD YOU BOUGHT JUST DIDN'T LAST AND YOU DIDN'T HAVE MONEY TO GET MORE.: NEVER TRUE

## 2022-01-12 ASSESSMENT — SOCIAL DETERMINANTS OF HEALTH (SDOH): HOW HARD IS IT FOR YOU TO PAY FOR THE VERY BASICS LIKE FOOD, HOUSING, MEDICAL CARE, AND HEATING?: NOT HARD AT ALL

## 2022-01-12 NOTE — PROGRESS NOTES
[place] [address]  [id number] [facility]. [address]  [phone]  [phone]    Silvestre Villegas is a 78 y.o. female who is here with c/o of:    Chief Complaint: 6 Month Follow-Up and Hypothyroidism      Patient Accompanied by: n/a    HPI - Silvestre Villegas is here today with c/o:    Patient here for re evaluation of chronic conditions.     Anxiety and depression-   Complaint with medication. Reports her mood is stable with the medication. Denies SI or HI.     Hypothyroid-   Follows with jayce, Dr Anisha Rajan. She does have enlarged thyroid that they regularly monitor. Has appt today.      Osteopenia-   Compliant with Fosamax. Dexa utd 10/2021     Hyperlipidemia-  No medication currently. Does not watch her diet or exercise routinely.        Health Maintenance Due   Topic Date Due    COVID-19 Vaccine (3 - Booster for Moderna series) 10/13/2021    TSH testing  2022        Patient Active Problem List:     Osteopenia     FH: breast cancer in first degree relative     Hx of thyroid nodule     Hypothyroidism     Electronic cigarette use     Vaginal dryness, menopausal     Anxiety and depression     Primary osteoarthritis of right knee     Mixed hyperlipidemia     Past Medical History:   Diagnosis Date    Anxiety     Arthritis     Depression     Fracture     Hyperlipidemia     Hypothyroidism     Thyroid disease     Dr. Anisha Rajan      Past Surgical History:   Procedure Laterality Date    ANKLE SURGERY      APPENDECTOMY      HYSTERECTOMY      HYSTERECTOMY, TOTAL ABDOMINAL       Family History   Problem Relation Age of Onset    Breast Cancer Mother     Heart Attack Mother     Heart Attack Sister     Other Brother         HIV     Social History     Tobacco Use    Smoking status: Former Smoker     Packs/day: 0.50     Years: 38.00     Pack years: 19.00     Types: Cigarettes     Quit date: 3/13/2013     Years since quittin.8    Smokeless tobacco: Never Used   [facility] Tobacco comment: uses electric cig   Substance Use Topics    Alcohol use: No     ALLERGIES:  No Known Allergies       Subjective   Review of Systems   · Constitutional:  Negative for activity change, appetite change,unexpected weight change, chills, fever, and fatigue. · HENT: Negative for ear pain, sore throat,  Rhinorrhea, sinus pain, sinus pressure, congestion. · Eyes:  Negative for pain and discharge. · Respiratory:  Negative for chest tightness, shortness of breath, wheezing, and cough. · Cardiovascular:  Negative for chest pain, palpitations and leg swelling. · Gastrointestinal: Negative for abdominal pain, blood in stool, constipation,diarrhea, nausea and vomiting. · Endocrine: Negative for cold intolerance, heat intolerance, polydipsia, polyphagia and polyuria. · Genitourinary: Negative for difficulty urinating, dysuria, flank pain, frequency, hematuria and urgency. · Musculoskeletal: Negative for arthralgias, back pain, joint swelling, myalgias, neck pain and neck stiffness. · Skin: Negative for rash and wound. · Allergic/Immunologic: Negative for environmental allergies and food allergies. · Neurological:  Negative for dizziness, light-headedness, numbness and headaches. · Hematological:  Negative for adenopathy. Does not bruise/bleed easily. · Psychiatric/Behavioral: Negative for self-injury, sleep disturbance and suicidal ideas. Objective   Physical Exam   PHYSICAL EXAM:   · Constitutional: Zacarias Longo is oriented to person, place, and time. Vital signs are normal. Appears well-developed and well-nourished. · Eyes:PERRL, EOMI, Conjunctiva normal, No discharge. · Neck: Full passive range of motion. Non-tender on palpation. Neck supple. No thyromegaly present. Trachea normal.  · Cardiovascular: Normal rate, regular rhythm, S1, S2, no murmur, no gallop, no friction rub, intact distal pulses.     · Pulmonary/Chest: Breath sounds are clear throughout, No respiratory distress, No wheezing, No chest tenderness. Effort normal  · Abdominal: Soft. Normal appearance, bowel sounds are present and normoactive. There is no hepatosplenomegaly. There is no tenderness. There is no CVA tenderness. · Musculoskeletal: Extremities appear regular and symmetric. No evident masses, lesions, foreign bodies, or other abnormalities. No edema. No tenderness on palpation. Joints are stable. Full ROM, strength and tone are within normal limits. · Neurological: Alert and oriented to person, place, and time. Normal motor function, Normal sensory function, No focal deficits noted. He has normal strength. · Skin: Skin is warm, dry and intact. No obvious lesions on exposed skin  · Psychiatric: Normal mood and affect. Speech is normal and behavior is normal.     Nursing note and vitals reviewed. Blood pressure 124/82, pulse 103, temperature 96.8 °F (36 °C), temperature source Temporal, weight 162 lb (73.5 kg), SpO2 98 %, not currently breastfeeding. Body mass index is 32.72 kg/m². Wt Readings from Last 3 Encounters:   01/12/22 162 lb (73.5 kg)   12/09/21 165 lb (74.8 kg)   09/09/21 161 lb (73 kg)     BP Readings from Last 3 Encounters:   01/12/22 124/82   12/09/21 (!) 165/95   09/09/21 130/84       Hospital Outpatient Visit on 12/09/2021   Component Date Value Ref Range Status    Dermatology Pathology Report 12/09/2021    Final                    Value:-- Diagnosis --  RIGHT INDEX FINGERNAIL, CLIPPINGS:       -  ONYCHOMYCOSIS. Magda Larose M.D., [address] [place] [address]. AP/CP, Dermatopathology  **Electronically Signed Out**  jet/12/13/2021       Clinical Information  Pre-op Diagnosis:  ONYCHOLYSIS     Operative Findings:  NAIL CLIPPING OF RIGHT INDEX FINGER FOR PAS STAIN      Source of Specimen  1: R INDEX FINGER (PER CONTAINER)    Gross Description  \"ADILENE SALAZAR, R INDEX FINGER\" Nail fragments, 1.5 x 0.6 x 0.2 cm in  aggregate. Entirely 1cs.   tm      Microscopic Description  The sections show a dystrophic nail plate composed of ortho- and  parakeratotic keratinocytes. PAS staining reveals the presence of  dermatophyte hyphae within the dystrophic nail plate. The underlying  nail bed is not included. Controls stain appropriately. SURGICAL PATHOLOGY CONSULTATION       Patient Name: Natalie Ramirez  [facility] Rec: [id number]  Path Number: [id number]    [facility] [address].                          . [facility], [address]  [phone]  Fax: [phone]       No results found for this visit on 01/12/22. Completed Orders/Prescriptions   No orders of the defined types were placed in this encounter. AssessmentPlan/Medical Decision Making     1. Osteopenia, unspecified location    - Dexa utd  - Continue Cugbmue25 mg weekly  - CBC Auto Differential; Future  - Comprehensive Metabolic Panel; Future    2. Hypothyroidism, unspecified type    - Follows with endo, [place]    3. Mixed hyperlipidemia    - Will check labs  - CBC Auto Differential; Future  - Comprehensive Metabolic Panel; Future  - Lipid, Fasting; Future    4. Anxiety and depression    - Stable  - Continue Zoloft 50 mg daily  - CBC Auto Differential; Future  - Comprehensive Metabolic Panel; Future      Return in about 6 months (around 7/12/2022) for chronic conditions. 1.  Jon Ocampo received counseling on the following healthy behaviors: nutrition, exercise and medication adherence  2. Patient given educational materials - see patient instructions  3. Was a self-tracking handout given in paper form or via I-lightingt? No  If yes, see orders or list here. 4.  Discussed use, benefit, and side effects of prescribed medications. Barriers to medication compliance addressed. All patient questions answered. Pt voiced understanding. 5.  Reviewed prior labs, imaging, consultation, follow up, and health maintenance  6. Continue current medications, diet and exercise.   7. Discussed use, benefit, and side effects of prescribed medications. Barriers to medication compliance addressed. All her questions were answered. Pt voiced understanding. Burgess Rivers will continue current medications, diet and exercise. Of the 30 minute duration appointment visit, Landry Rowe CNP spent at least 50% of the face-to-face time in counseling, explanation of diagnosis, planning of further management, and answering all questions.           Signed:  Landry Rowe CNP

## 2022-01-21 LAB
ALBUMIN SERPL-MCNC: 4.4 G/DL
ALP BLD-CCNC: 52 U/L
ALT SERPL-CCNC: 11 U/L
ANION GAP SERPL CALCULATED.3IONS-SCNC: 12 MMOL/L
AST SERPL-CCNC: 16 U/L
BASOPHILS ABSOLUTE: 0 /ΜL
BASOPHILS RELATIVE PERCENT: 0.8 %
BILIRUB SERPL-MCNC: 0.8 MG/DL (ref 0.1–1.4)
BUN BLDV-MCNC: 13 MG/DL
CALCIUM SERPL-MCNC: 9.5 MG/DL
CHLORIDE BLD-SCNC: 105 MMOL/L
CHOLESTEROL, FASTING: 240
CO2: 21 MMOL/L
CREAT SERPL-MCNC: 0.96 MG/DL
EOSINOPHILS ABSOLUTE: 0 /ΜL
EOSINOPHILS RELATIVE PERCENT: 0.6 %
GFR CALCULATED: >60
GLUCOSE BLD-MCNC: 89 MG/DL
HCT VFR BLD CALC: 43.8 % (ref 36–46)
HDLC SERPL-MCNC: 73 MG/DL (ref 35–70)
HEMOGLOBIN: 14 G/DL (ref 12–16)
LDL CHOLESTEROL CALCULATED: 142 MG/DL (ref 0–160)
LYMPHOCYTES ABSOLUTE: 2.6 /ΜL
LYMPHOCYTES RELATIVE PERCENT: 51.9 %
MCH RBC QN AUTO: 28.3 PG
MCHC RBC AUTO-ENTMCNC: 32 G/DL
MCV RBC AUTO: 88 FL
MONOCYTES ABSOLUTE: 0.2 /ΜL
MONOCYTES RELATIVE PERCENT: 4.8 %
NEUTROPHILS ABSOLUTE: 2.1 /ΜL
NEUTROPHILS RELATIVE PERCENT: 41.9 %
PDW BLD-RTO: 13.9 %
PLATELET # BLD: 267 K/ΜL
PMV BLD AUTO: 8.7 FL
POTASSIUM SERPL-SCNC: 4.3 MMOL/L
RBC # BLD: 4.97 10^6/ΜL
SODIUM BLD-SCNC: 138 MMOL/L
TOTAL PROTEIN: 7.6
TRIGLYCERIDE, FASTING: 125
WBC # BLD: 5 10^3/ML

## 2022-02-01 DIAGNOSIS — F41.9 ANXIETY AND DEPRESSION: ICD-10-CM

## 2022-02-01 DIAGNOSIS — E78.2 MIXED HYPERLIPIDEMIA: ICD-10-CM

## 2022-02-01 DIAGNOSIS — M85.80 OSTEOPENIA, UNSPECIFIED LOCATION: ICD-10-CM

## 2022-02-01 DIAGNOSIS — F32.A ANXIETY AND DEPRESSION: ICD-10-CM

## 2022-03-17 DIAGNOSIS — E03.9 HYPOTHYROIDISM, UNSPECIFIED TYPE: ICD-10-CM

## 2022-03-17 RX ORDER — LEVOTHYROXINE SODIUM 88 UG/1
88 TABLET ORAL DAILY
Qty: 90 TABLET | Refills: 1 | Status: SHIPPED | OUTPATIENT
Start: 2022-03-17

## 2022-04-07 ENCOUNTER — TELEPHONE (OUTPATIENT)
Dept: FAMILY MEDICINE CLINIC | Age: 67
End: 2022-04-07

## 2022-04-07 DIAGNOSIS — Z12.31 ENCOUNTER FOR SCREENING MAMMOGRAM FOR MALIGNANT NEOPLASM OF BREAST: Primary | ICD-10-CM

## 2022-04-09 ENCOUNTER — HOSPITAL ENCOUNTER (OUTPATIENT)
Dept: MAMMOGRAPHY | Age: 67
Discharge: HOME | End: 2022-04-11
Payer: MEDICARE

## 2022-04-09 DIAGNOSIS — Z12.31 ENCOUNTER FOR SCREENING MAMMOGRAM FOR MALIGNANT NEOPLASM OF BREAST: ICD-10-CM

## 2022-04-09 PROCEDURE — 77063 BREAST TOMOSYNTHESIS BI: CPT

## 2022-04-11 ENCOUNTER — OFFICE VISIT (OUTPATIENT)
Dept: DERMATOLOGY | Age: 67
End: 2022-04-11
Payer: MEDICARE

## 2022-04-11 VITALS
BODY MASS INDEX: 32.09 KG/M2 | SYSTOLIC BLOOD PRESSURE: 128 MMHG | DIASTOLIC BLOOD PRESSURE: 81 MMHG | HEIGHT: 59 IN | HEART RATE: 90 BPM | WEIGHT: 159.2 LBS | TEMPERATURE: 97.5 F | OXYGEN SATURATION: 94 %

## 2022-04-11 DIAGNOSIS — L60.3 ONYCHODYSTROPHY: ICD-10-CM

## 2022-04-11 DIAGNOSIS — L60.8 SPLINTER HEMORRHAGE: Primary | ICD-10-CM

## 2022-04-11 DIAGNOSIS — L60.1 ONYCHOLYSIS: ICD-10-CM

## 2022-04-11 PROCEDURE — 1036F TOBACCO NON-USER: CPT | Performed by: DERMATOLOGY

## 2022-04-11 PROCEDURE — 99212 OFFICE O/P EST SF 10 MIN: CPT | Performed by: DERMATOLOGY

## 2022-04-11 PROCEDURE — G8427 DOCREV CUR MEDS BY ELIG CLIN: HCPCS | Performed by: DERMATOLOGY

## 2022-04-11 PROCEDURE — G8399 PT W/DXA RESULTS DOCUMENT: HCPCS | Performed by: DERMATOLOGY

## 2022-04-11 PROCEDURE — 4040F PNEUMOC VAC/ADMIN/RCVD: CPT | Performed by: DERMATOLOGY

## 2022-04-11 PROCEDURE — 3017F COLORECTAL CA SCREEN DOC REV: CPT | Performed by: DERMATOLOGY

## 2022-04-11 PROCEDURE — 1090F PRES/ABSN URINE INCON ASSESS: CPT | Performed by: DERMATOLOGY

## 2022-04-11 PROCEDURE — G8417 CALC BMI ABV UP PARAM F/U: HCPCS | Performed by: DERMATOLOGY

## 2022-04-11 PROCEDURE — 1123F ACP DISCUSS/DSCN MKR DOCD: CPT | Performed by: DERMATOLOGY

## 2022-04-11 NOTE — PROGRESS NOTES
Dermatology Patient Note  [facility] [address]  [place] [address]  Dept: [phone]  Dept Fax: [phone]      VISITDATE: 4/11/2022   REFERRING PROVIDER: No ref. provider found      Curtis Rose is a 78 y.o. female  who presents today in the office for:    Other (Pt states that her nail is great. She is still using the clobetasol. )      HISTORY OF PRESENT ILLNESS:  As above. MEDICAL PROBLEMS:  Patient Active Problem List    Diagnosis Date Noted    Vaginal dryness, menopausal 09/02/2015     Priority: High     Started on vaginal estradiol      Electronic cigarette use 08/27/2014     Priority: High     (9/2015) Continues use      Osteopenia 08/12/2013     Priority: High     On Fosamax  Last DEXA scan 10/2015      Hypothyroidism 10/10/2013     Priority: Medium     Followed by endocrinology      FH: breast cancer in first degree relative 08/12/2013     Priority: Medium    Hx of thyroid nodule 08/12/2013     Priority: Medium     Followed by endocrinology      Mixed hyperlipidemia 07/08/2021    Anxiety and depression 01/06/2021    Primary osteoarthritis of right knee 10/17/2019       CURRENT MEDICATIONS:   Current Outpatient Medications   Medication Sig Dispense Refill    levothyroxine (SYNTHROID) 88 MCG tablet Take 1 tablet by mouth daily 90 tablet 1    alendronate (FOSAMAX) 70 MG tablet TAKE 1 TABLET BY MOUTH EVERY 7 DAYS 12 tablet 1    clobetasol (TEMOVATE) 0.05 % ointment Apply to nailbed twice daily 15 g 2    sertraline (ZOLOFT) 50 MG tablet Take 1 tablet by mouth daily 90 tablet 1    ibuprofen (ADVIL;MOTRIN) 800 MG tablet TAKE 1 TABLET BY MOUTH 3 TIMES DAILY WITH MEALS 90 tablet 0     No current facility-administered medications for this visit.        ALLERGIES:   No Known Allergies    SOCIAL HISTORY:  Social History     Tobacco Use    Smoking status: Former Smoker     Packs/day: 0.50     Years: 38.00 Pack years: 19.00     Types: Cigarettes     Quit date: 3/13/2013     Years since quittin.0    Smokeless tobacco: Never Used    Tobacco comment: uses electric cig   Substance Use Topics    Alcohol use: No       Pertinent ROS:  Review of Systems  Skin: Denies any new changing, growing or bleeding lesions or rashes except as described in the HPI   Constitutional: Denies fevers, chills, and malaise. PHYSICAL EXAM:   /81 (Site: Left Upper Arm, Position: Sitting, Cuff Size: Medium Adult)   Pulse 90   Temp 97.5 °F (36.4 °C) (Temporal)   Ht 4' 11\" (1.499 m)   Wt 159 lb 3.2 oz (72.2 kg)   LMP  (LMP Unknown)   SpO2 94%   BMI 32.15 kg/m²     The patient is generally well appearing, well nourished, alert and conversational. Affect is normal.    Cutaneous Exam:  Physical Exam  Focused exam of right index finger was performed    Facial covering was not removed during examination. Diagnoses/exam findings/medical history pertinent to this visit are listed below:    Assessment:   Diagnosis Orders   1. Splinter hemorrhage     2. Onycholysis     3. Onychodystrophy          Plan:  Onycholysis d/t onychomycosis - resolved with terbinafine and clobetasol  Splinter hemorrhage  - stop clobetasol  - discussed with patient that splinter hemorrhage will likely grow out    RTC prn    Future Appointments   Date Time Provider Chilo Cruz   2022  9:00 AM ИВАН Rosado - CNP [facility]         Patient Instructions   Stop clobetasol ointment        I, Elizabeth Preciado, personally scribed the services dictated to me by Dr. Valery Vicente in this documentation. I, Dr. Valery Vicente, personally performed the services described in this documentation, as scribed by Rios Mendez in my presence, and it is both accurate and complete.     Electronically signed by Rosa Ford MD on 2022 at 9:37 AM

## 2022-04-22 DIAGNOSIS — M85.80 OSTEOPENIA, UNSPECIFIED LOCATION: ICD-10-CM

## 2022-04-22 RX ORDER — ALENDRONATE SODIUM 70 MG/1
TABLET ORAL
Qty: 12 TABLET | Refills: 1 | Status: SHIPPED | OUTPATIENT
Start: 2022-04-22 | End: 2022-07-12

## 2022-07-12 ENCOUNTER — OFFICE VISIT (OUTPATIENT)
Dept: FAMILY MEDICINE CLINIC | Age: 67
End: 2022-07-12
Payer: MEDICARE

## 2022-07-12 ENCOUNTER — HOSPITAL ENCOUNTER (OUTPATIENT)
Age: 67
Setting detail: SPECIMEN
Discharge: HOME | End: 2022-07-12

## 2022-07-12 VITALS
SYSTOLIC BLOOD PRESSURE: 136 MMHG | HEART RATE: 87 BPM | WEIGHT: 151 LBS | BODY MASS INDEX: 30.5 KG/M2 | TEMPERATURE: 97.8 F | OXYGEN SATURATION: 98 % | DIASTOLIC BLOOD PRESSURE: 78 MMHG

## 2022-07-12 DIAGNOSIS — E03.9 HYPOTHYROIDISM, UNSPECIFIED TYPE: ICD-10-CM

## 2022-07-12 DIAGNOSIS — M85.80 OSTEOPENIA, UNSPECIFIED LOCATION: ICD-10-CM

## 2022-07-12 DIAGNOSIS — F41.9 ANXIETY AND DEPRESSION: Primary | ICD-10-CM

## 2022-07-12 DIAGNOSIS — E78.2 MIXED HYPERLIPIDEMIA: ICD-10-CM

## 2022-07-12 DIAGNOSIS — F32.A ANXIETY AND DEPRESSION: Primary | ICD-10-CM

## 2022-07-12 LAB
CHOLESTEROL, FASTING: 206 MG/DL
CHOLESTEROL/HDL RATIO: 3.6
HDLC SERPL-MCNC: 58 MG/DL
LDL CHOLESTEROL: 133 MG/DL (ref 0–130)
TRIGLYCERIDE, FASTING: 73 MG/DL

## 2022-07-12 PROCEDURE — 1036F TOBACCO NON-USER: CPT | Performed by: NURSE PRACTITIONER

## 2022-07-12 PROCEDURE — 99214 OFFICE O/P EST MOD 30 MIN: CPT | Performed by: NURSE PRACTITIONER

## 2022-07-12 PROCEDURE — 1123F ACP DISCUSS/DSCN MKR DOCD: CPT | Performed by: NURSE PRACTITIONER

## 2022-07-12 PROCEDURE — 3017F COLORECTAL CA SCREEN DOC REV: CPT | Performed by: NURSE PRACTITIONER

## 2022-07-12 PROCEDURE — G8427 DOCREV CUR MEDS BY ELIG CLIN: HCPCS | Performed by: NURSE PRACTITIONER

## 2022-07-12 PROCEDURE — 1090F PRES/ABSN URINE INCON ASSESS: CPT | Performed by: NURSE PRACTITIONER

## 2022-07-12 PROCEDURE — G8417 CALC BMI ABV UP PARAM F/U: HCPCS | Performed by: NURSE PRACTITIONER

## 2022-07-12 PROCEDURE — G8399 PT W/DXA RESULTS DOCUMENT: HCPCS | Performed by: NURSE PRACTITIONER

## 2022-07-12 RX ORDER — ALENDRONATE SODIUM 70 MG/1
TABLET ORAL
Qty: 12 TABLET | Refills: 1 | Status: CANCELLED | OUTPATIENT
Start: 2022-07-12

## 2022-07-12 RX ORDER — IBUPROFEN 800 MG/1
TABLET ORAL
Qty: 90 TABLET | Refills: 0 | Status: CANCELLED | OUTPATIENT
Start: 2022-07-12

## 2022-07-12 RX ORDER — ALENDRONATE SODIUM 70 MG/1
TABLET ORAL
Qty: 12 TABLET | Refills: 1 | Status: SHIPPED | OUTPATIENT
Start: 2022-07-12 | End: 2022-09-22 | Stop reason: SDUPTHER

## 2022-07-12 ASSESSMENT — PATIENT HEALTH QUESTIONNAIRE - PHQ9
SUM OF ALL RESPONSES TO PHQ QUESTIONS 1-9: 0
5. POOR APPETITE OR OVEREATING: 0
SUM OF ALL RESPONSES TO PHQ9 QUESTIONS 1 & 2: 0
2. FEELING DOWN, DEPRESSED OR HOPELESS: 0
8. MOVING OR SPEAKING SO SLOWLY THAT OTHER PEOPLE COULD HAVE NOTICED. OR THE OPPOSITE, BEING SO FIGETY OR RESTLESS THAT YOU HAVE BEEN MOVING AROUND A LOT MORE THAN USUAL: 0
10. IF YOU CHECKED OFF ANY PROBLEMS, HOW DIFFICULT HAVE THESE PROBLEMS MADE IT FOR YOU TO DO YOUR WORK, TAKE CARE OF THINGS AT HOME, OR GET ALONG WITH OTHER PEOPLE: 0
3. TROUBLE FALLING OR STAYING ASLEEP: 0
1. LITTLE INTEREST OR PLEASURE IN DOING THINGS: 0
9. THOUGHTS THAT YOU WOULD BE BETTER OFF DEAD, OR OF HURTING YOURSELF: 0
6. FEELING BAD ABOUT YOURSELF - OR THAT YOU ARE A FAILURE OR HAVE LET YOURSELF OR YOUR FAMILY DOWN: 0
4. FEELING TIRED OR HAVING LITTLE ENERGY: 0
SUM OF ALL RESPONSES TO PHQ QUESTIONS 1-9: 0
7. TROUBLE CONCENTRATING ON THINGS, SUCH AS READING THE NEWSPAPER OR WATCHING TELEVISION: 0

## 2022-07-12 NOTE — PROGRESS NOTES
Years since quittin.3    Smokeless tobacco: Never Used    Tobacco comment: uses electric cig   Substance Use Topics    Alcohol use: No     ALLERGIES:  No Known Allergies       Subjective   Review of Systems   · Constitutional:  Negative for activity change, appetite change,unexpected weight change, chills, fever, and fatigue. · HENT: Negative for ear pain, sore throat,  Rhinorrhea, sinus pain, sinus pressure, congestion. · Eyes:  Negative for pain and discharge. · Respiratory:  Negative for chest tightness, shortness of breath, wheezing, and cough. · Cardiovascular:  Negative for chest pain, palpitations and leg swelling. · Gastrointestinal: Negative for abdominal pain, blood in stool, constipation,diarrhea, nausea and vomiting. · Endocrine: Negative for cold intolerance, heat intolerance, polydipsia, polyphagia and polyuria. · Genitourinary: Negative for difficulty urinating, dysuria, flank pain, frequency, hematuria and urgency. · Musculoskeletal: Negative for arthralgias, back pain, joint swelling, myalgias, neck pain and neck stiffness. · Skin: Negative for rash and wound. · Allergic/Immunologic: Negative for environmental allergies and food allergies. · Neurological:  Negative for dizziness, light-headedness, numbness and headaches. · Hematological:  Negative for adenopathy. Does not bruise/bleed easily. · Psychiatric/Behavioral: Negative for self-injury, sleep disturbance and suicidal ideas. Objective   Physical Exam   PHYSICAL EXAM:   · Constitutional: [facility] is oriented to person, place, and time. Vital signs are normal. Appears well-developed and well-nourished. · Head: Normocephalic and atraumatic. · Eyes:PERRL, EOMI, Conjunctiva normal, No discharge. · Neck: Full passive range of motion. Non-tender on palpation. Neck supple. No thyromegaly present.  Trachea normal.  · Cardiovascular: Normal rate, regular rhythm, S1, S2, no murmur, no gallop, no friction rub, Total Protein 01/21/2022 7.6   Final    CO2 01/21/2022 21  mmol/L Final    Albumin 01/21/2022 4.4   Final    Alkaline Phosphatase 01/21/2022 52  U/L Final    Total Bilirubin 01/21/2022 0.8  0.1 - 1.4 mg/dL Final    Gfr Calculated 01/21/2022 >60   Final    Anion Gap 01/21/2022 12  mmol/L Final    WBC 01/21/2022 5.0  10^3/mL Final    RBC 01/21/2022 4.97  10^6/µL Final    Hemoglobin 01/21/2022 14.0  12.0 - 16.0 g/dL Final    Hematocrit 01/21/2022 43.8  36 - 46 % Final    MCV 01/21/2022 88  fL Final    MCH 01/21/2022 28.3  pg Final    MCHC 01/21/2022 32.0  g/dL Final    Platelets 27/82/8067 267  K/µL Final    RDW 01/21/2022 13.9  % Final    MPV 01/21/2022 8.7  fL Final    Neutrophils % 01/21/2022 41.9  % Final    Lymphocytes % 01/21/2022 51.9  % Final    Monocytes % 01/21/2022 4.8  % Final    Eosinophils % 01/21/2022 0.6  % Final    Basophils % 01/21/2022 0.8  % Final    Neutrophils Absolute 01/21/2022 2.1  /µL Final    Lymphocytes Absolute 01/21/2022 2.6  /µL Final    Monocytes Absolute 01/21/2022 0.2  /µL Final    Eosinophils Absolute 01/21/2022 0.0  /µL Final    Basophils Absolute 01/21/2022 0.0  /µL Final     No results found for this visit on 07/12/22. Completed Orders/Prescriptions   Orders Placed This Encounter   Medications    alendronate (FOSAMAX) 70 MG tablet     Sig: TAKE 1 TABLET BY MOUTH EVERY 7 DAYS     Dispense:  12 tablet     Refill:  1               AssessmentPlan/Medical Decision Making     1. Anxiety and depression    - Stable  - Continue Zoloft 50 mg daily    2. Osteopenia, unspecified location    - Stable  - alendronate (FOSAMAX) 70 MG tablet; TAKE 1 TABLET BY MOUTH EVERY 7 DAYS  Dispense: 12 tablet; Refill: 1    3. Mixed hyperlipidemia    - Lipid, Fasting; Future    4. Hypothyroidism, unspecified type    - Stable  - Follows with Endo      Return in about 6 months (around 1/12/2023) for chronic conditions.     1.  Barbie Nagys received counseling on the following healthy behaviors: nutrition, exercise and medication adherence  2. Patient given educational materials - see patient instructions  3. Was a self-tracking handout given in paper form or via [place]? No  If yes, see orders or list here. 4.  Discussed use, benefit, and side effects of prescribed medications. Barriers to medication compliance addressed. All patient questions answered. Pt voiced understanding. 5.  Reviewed prior labs, imaging, consultation, follow up, and health maintenance  6. Continue current medications, diet and exercise. 7. Discussed use, benefit, and side effects of prescribed medications. Barriers to medication compliance addressed. All her questions were answered. Pt voiced understanding. Khadra Hitchcock will continue current medications, diet and exercise. Of the 30 minute duration appointment visit, Leticia Muñoz CNP spent at least 50% of the face-to-face time in counseling, explanation of diagnosis, planning of further management, and answering all questions.           Signed:  Leticia Muñoz CNP

## 2022-09-16 DIAGNOSIS — F41.9 ANXIETY AND DEPRESSION: ICD-10-CM

## 2022-09-16 DIAGNOSIS — F32.A ANXIETY AND DEPRESSION: ICD-10-CM

## 2022-09-18 DIAGNOSIS — F32.A ANXIETY AND DEPRESSION: ICD-10-CM

## 2022-09-18 DIAGNOSIS — F41.9 ANXIETY AND DEPRESSION: ICD-10-CM

## 2022-09-22 DIAGNOSIS — M85.80 OSTEOPENIA, UNSPECIFIED LOCATION: ICD-10-CM

## 2022-09-22 RX ORDER — ALENDRONATE SODIUM 70 MG/1
TABLET ORAL
Qty: 12 TABLET | Refills: 1 | Status: SHIPPED | OUTPATIENT
Start: 2022-09-22

## 2022-09-22 NOTE — TELEPHONE ENCOUNTER
Last visit: 7/12/22  Last Med refill: 7/12/22  Does patient have enough medication for 72 hours:     Next Visit Date: 1/12/23  Future Appointments   Date Time Provider Chilo Cruz   1/12/2023  9:20 AM ИВАН Garland CNP [place] [id number] Delivery Agent Maintenance   Topic Date Due    Pneumococcal 65+ years Vaccine (1 - PCV) 01/11/2021    COVID-19 Vaccine (3 - Booster for Moderna series) 09/13/2021    Flu vaccine (1) 09/01/2022    Shingles vaccine (1 of 2) 07/12/2023 (Originally 1/11/2006)    Breast cancer screen  04/09/2023    Depression Monitoring  07/12/2023    Colorectal Cancer Screen  07/20/2026    Lipids  07/12/2027    DTaP/Tdap/Td vaccine (2 - Td or Tdap) 03/05/2029    DEXA (modify frequency per FRAX score)  Completed    Hepatitis C screen  Completed    Hepatitis A vaccine  Aged Out    Hepatitis B vaccine  Aged Out    Hib vaccine  Aged Out    Meningococcal (ACWY) vaccine  Aged Out       No results found for: LABA1C          ( goal A1C is < 7)   No results found for: LABMICR  LDL Cholesterol (mg/dL)   Date Value   07/12/2022 133 (H)   07/09/2021 129     LDL Calculated (mg/dL)   Date Value   01/21/2022 142   12/09/2017 108       (goal LDL is <100)   AST (U/L)   Date Value   01/21/2022 16     ALT (U/L)   Date Value   01/21/2022 11     BUN (mg/dL)   Date Value   01/21/2022 13     BP Readings from Last 3 Encounters:   07/12/22 136/78   04/11/22 128/81   01/12/22 124/82          (goal 120/80)    All Future Testing planned in [facility]  Lab Frequency Next Occurrence   Lipid Panel Once 04/08/2022               Patient Active Problem List:     Osteopenia     FH: breast cancer in first degree relative     Hx of thyroid nodule     Hypothyroidism     Electronic cigarette use     Vaginal dryness, menopausal     Anxiety and depression     Primary osteoarthritis of right knee     Mixed hyperlipidemia

## 2022-09-22 NOTE — TELEPHONE ENCOUNTER
----- Message from Solis Barrios sent at 9/22/2022  9:52 AM EDT -----  Subject: Refill Request    QUESTIONS  Name of Medication? sertraline (ZOLOFT) 50 MG tablet  Patient-reported dosage and instructions? 1 a day   How many days do you have left? 0  Preferred Pharmacy? [id number]  Pharmacy phone number (if available)? [phone]  ---------------------------------------------------------------------------  --------------,  Name of Medication? alendronate (FOSAMAX) 70 MG tablet  Patient-reported dosage and instructions? once a week   How many days do you have left? 1  Preferred Pharmacy? [id number]  Pharmacy phone number (if available)? [phone]  ---------------------------------------------------------------------------  --------------  CALL BACK INFO  What is the best way for the office to contact you? OK to leave message on   voicemail  Preferred Call Back Phone Number? [phone]  ---------------------------------------------------------------------------  --------------  SCRIPT ANSWERS  Relationship to Patient?  Self

## 2022-09-28 DIAGNOSIS — F32.A ANXIETY AND DEPRESSION: ICD-10-CM

## 2022-09-28 DIAGNOSIS — F41.9 ANXIETY AND DEPRESSION: ICD-10-CM

## 2022-09-28 NOTE — TELEPHONE ENCOUNTER
Last Visit:  7/12/2022     Next Visit Date:1/12/2023  Future Appointments   Date Time Provider Chilo Cruz   1/12/2023  9:20 AM ИВАН Ann Maintenance   Topic Date Due    COVID-19 Vaccine (3 - Booster for Moderna series) 09/13/2021    Flu vaccine (1) 08/01/2022    Shingles vaccine (1 of 2) 07/12/2023 (Originally 1/11/2006)    Breast cancer screen  04/09/2023    Depression Monitoring  07/12/2023    Colorectal Cancer Screen  07/20/2026    Lipids  07/12/2027    DTaP/Tdap/Td vaccine (2 - Td or Tdap) 03/05/2029    DEXA (modify frequency per FRAX score)  Completed    Pneumococcal 65+ years Vaccine  Completed    Hepatitis C screen  Completed    Hepatitis A vaccine  Aged Out    Hepatitis B vaccine  Aged Out    Hib vaccine  Aged Out    Meningococcal (ACWY) vaccine  Aged Out       No results found for: LABA1C          ( goal A1C is < 7)   No results found for: LABMICR  LDL Cholesterol (mg/dL)   Date Value   07/12/2022 133 (H)   07/09/2021 129     LDL Calculated (mg/dL)   Date Value   01/21/2022 142   12/09/2017 108       (goal LDL is <100)   AST (U/L)   Date Value   01/21/2022 16     ALT (U/L)   Date Value   01/21/2022 11     BUN (mg/dL)   Date Value   01/21/2022 13     BP Readings from Last 3 Encounters:   07/12/22 136/78   04/11/22 128/81   01/12/22 124/82          (goal 120/80)    All Future Testing planned in [facility]  Lab Frequency Next Occurrence   Lipid Panel Once 04/08/2022               Patient Active Problem List:     Osteopenia     FH: breast cancer in first degree relative     Hx of thyroid nodule     Hypothyroidism     Electronic cigarette use     Vaginal dryness, menopausal     Anxiety and depression     Primary osteoarthritis of right knee     Mixed hyperlipidemia

## 2022-09-28 NOTE — TELEPHONE ENCOUNTER
[facility] pharmacy called for refill for pt and the ref# [id number].  Thank you

## 2022-09-30 ENCOUNTER — TELEPHONE (OUTPATIENT)
Dept: FAMILY MEDICINE CLINIC | Age: 67
End: 2022-09-30

## 2022-09-30 NOTE — TELEPHONE ENCOUNTER
Spoke with patient. She wants to make sure it will be covered so she will call insurance and then us at the office to schedule when ready.  @ 3:13PM 9/30 ABDIEL

## 2022-09-30 NOTE — TELEPHONE ENCOUNTER
Patient was informed about the flu shot but was also inquiring about a Shingles and Pneumonia vaccine. Can she get them? Can she get them here at the office? Please give her a call with an answer. Thank you.

## 2022-09-30 NOTE — TELEPHONE ENCOUNTER
She can get flu and pneumonia vaccines in our office.  Shingles vaccine will need to be done at pharmacy

## 2023-01-12 ENCOUNTER — OFFICE VISIT (OUTPATIENT)
Dept: FAMILY MEDICINE CLINIC | Age: 68
End: 2023-01-12

## 2023-01-12 ENCOUNTER — HOSPITAL ENCOUNTER (OUTPATIENT)
Age: 68
Setting detail: SPECIMEN
Discharge: HOME | End: 2023-01-12

## 2023-01-12 VITALS
OXYGEN SATURATION: 99 % | SYSTOLIC BLOOD PRESSURE: 122 MMHG | TEMPERATURE: 96.6 F | BODY MASS INDEX: 29.29 KG/M2 | DIASTOLIC BLOOD PRESSURE: 60 MMHG | WEIGHT: 145 LBS | HEART RATE: 98 BPM

## 2023-01-12 DIAGNOSIS — F41.9 ANXIETY AND DEPRESSION: ICD-10-CM

## 2023-01-12 DIAGNOSIS — E78.2 MIXED HYPERLIPIDEMIA: ICD-10-CM

## 2023-01-12 DIAGNOSIS — E03.9 HYPOTHYROIDISM, UNSPECIFIED TYPE: Primary | ICD-10-CM

## 2023-01-12 DIAGNOSIS — M85.80 OSTEOPENIA, UNSPECIFIED LOCATION: ICD-10-CM

## 2023-01-12 DIAGNOSIS — F32.A ANXIETY AND DEPRESSION: ICD-10-CM

## 2023-01-12 LAB
ABSOLUTE EOS #: 0.05 K/UL (ref 0–0.44)
ABSOLUTE IMMATURE GRANULOCYTE: <0.03 K/UL (ref 0–0.3)
ABSOLUTE LYMPH #: 3.39 K/UL (ref 1.1–3.7)
ABSOLUTE MONO #: 0.56 K/UL (ref 0.1–1.2)
ALBUMIN SERPL-MCNC: 4.2 G/DL (ref 3.5–5.2)
ALBUMIN/GLOBULIN RATIO: 1.3 (ref 1–2.5)
ALP BLD-CCNC: 63 U/L (ref 35–104)
ALT SERPL-CCNC: 15 U/L (ref 5–33)
ANION GAP SERPL CALCULATED.3IONS-SCNC: 13 MMOL/L (ref 9–17)
AST SERPL-CCNC: 19 U/L
BASOPHILS # BLD: 1 % (ref 0–2)
BASOPHILS ABSOLUTE: 0.05 K/UL (ref 0–0.2)
BILIRUB SERPL-MCNC: 0.5 MG/DL (ref 0.3–1.2)
BUN BLDV-MCNC: 13 MG/DL (ref 8–23)
CALCIUM SERPL-MCNC: 9.8 MG/DL (ref 8.6–10.4)
CHLORIDE BLD-SCNC: 107 MMOL/L (ref 98–107)
CHOLESTEROL, FASTING: 191 MG/DL
CHOLESTEROL/HDL RATIO: 2.8
CO2: 23 MMOL/L (ref 20–31)
CREAT SERPL-MCNC: 0.92 MG/DL (ref 0.5–0.9)
EOSINOPHILS RELATIVE PERCENT: 1 % (ref 1–4)
GFR SERPL CREATININE-BSD FRML MDRD: >60 ML/MIN/1.73M2
GLUCOSE BLD-MCNC: 88 MG/DL (ref 70–99)
HCT VFR BLD CALC: 46.2 % (ref 36.3–47.1)
HDLC SERPL-MCNC: 68 MG/DL
HEMOGLOBIN: 14.2 G/DL (ref 11.9–15.1)
IMMATURE GRANULOCYTES: 0 %
LDL CHOLESTEROL: 112 MG/DL (ref 0–130)
LYMPHOCYTES # BLD: 45 % (ref 24–43)
MCH RBC QN AUTO: 28.5 PG (ref 25.2–33.5)
MCHC RBC AUTO-ENTMCNC: 30.7 G/DL (ref 28.4–34.8)
MCV RBC AUTO: 92.8 FL (ref 82.6–102.9)
MONOCYTES # BLD: 7 % (ref 3–12)
NRBC AUTOMATED: 0 PER 100 WBC
PDW BLD-RTO: 14.2 % (ref 11.8–14.4)
PLATELET # BLD: 300 K/UL (ref 138–453)
PMV BLD AUTO: 11.2 FL (ref 8.1–13.5)
POTASSIUM SERPL-SCNC: 4.5 MMOL/L (ref 3.7–5.3)
RBC # BLD: 4.98 M/UL (ref 3.95–5.11)
SEG NEUTROPHILS: 46 % (ref 36–65)
SEGMENTED NEUTROPHILS ABSOLUTE COUNT: 3.56 K/UL (ref 1.5–8.1)
SODIUM BLD-SCNC: 143 MMOL/L (ref 135–144)
TOTAL PROTEIN: 7.4 G/DL (ref 6.4–8.3)
TRIGLYCERIDE, FASTING: 56 MG/DL
WBC # BLD: 7.6 K/UL (ref 3.5–11.3)

## 2023-01-12 RX ORDER — LEVOTHYROXINE SODIUM 88 UG/1
88 TABLET ORAL DAILY
Qty: 90 TABLET | Refills: 1 | Status: SHIPPED | OUTPATIENT
Start: 2023-01-12

## 2023-01-12 ASSESSMENT — PATIENT HEALTH QUESTIONNAIRE - PHQ9
4. FEELING TIRED OR HAVING LITTLE ENERGY: 0
6. FEELING BAD ABOUT YOURSELF - OR THAT YOU ARE A FAILURE OR HAVE LET YOURSELF OR YOUR FAMILY DOWN: 0
SUM OF ALL RESPONSES TO PHQ QUESTIONS 1-9: 0
SUM OF ALL RESPONSES TO PHQ QUESTIONS 1-9: 0
8. MOVING OR SPEAKING SO SLOWLY THAT OTHER PEOPLE COULD HAVE NOTICED. OR THE OPPOSITE, BEING SO FIGETY OR RESTLESS THAT YOU HAVE BEEN MOVING AROUND A LOT MORE THAN USUAL: 0
1. LITTLE INTEREST OR PLEASURE IN DOING THINGS: 0
2. FEELING DOWN, DEPRESSED OR HOPELESS: 0
10. IF YOU CHECKED OFF ANY PROBLEMS, HOW DIFFICULT HAVE THESE PROBLEMS MADE IT FOR YOU TO DO YOUR WORK, TAKE CARE OF THINGS AT HOME, OR GET ALONG WITH OTHER PEOPLE: 0
SUM OF ALL RESPONSES TO PHQ9 QUESTIONS 1 & 2: 0
SUM OF ALL RESPONSES TO PHQ QUESTIONS 1-9: 0
5. POOR APPETITE OR OVEREATING: 0
3. TROUBLE FALLING OR STAYING ASLEEP: 0
SUM OF ALL RESPONSES TO PHQ QUESTIONS 1-9: 0
9. THOUGHTS THAT YOU WOULD BE BETTER OFF DEAD, OR OF HURTING YOURSELF: 0
7. TROUBLE CONCENTRATING ON THINGS, SUCH AS READING THE NEWSPAPER OR WATCHING TELEVISION: 0

## 2023-01-12 NOTE — PROGRESS NOTES
[facility] [address]  [id number] [facility]  [phone]  [phone]    Amna Bradford is a 80 y.o. female who is here with c/o of:    Chief Complaint: Annual Exam, Anxiety, and Depression      Patient Accompanied by: n/a    HPI - Amna Bradford is here today with c/o:    Patient here for re evaluation of chronic conditions. Anxiety and depression-   Complaint with medication. Reports her mood is stable with the medication. Denies SI or HI.    PHQ-9 Total Score: 0 (1/12/2023  9:28 AM)  Thoughts that you would be better off dead, or of hurting yourself in some way: 0 (1/12/2023  9:28 AM)      Hypothyroid-   Follows with endo. She does have enlarged thyroid that they regularly monitor. Osteopenia-   Compliant with Fosamax. Dexa utd 10/2021     Hyperlipidemia-  No medication currently. Does not watch her diet or exercise routinely.        Health Maintenance Due   Topic Date Due    COVID-19 Vaccine (3 - Booster for Moderna series) 06/08/2021        Patient Active Problem List:     Osteopenia     FH: breast cancer in first degree relative     Hx of thyroid nodule     Hypothyroidism     Electronic cigarette use     Vaginal dryness, menopausal     Anxiety and depression     Primary osteoarthritis of right knee     Mixed hyperlipidemia     Past Medical History:   Diagnosis Date    Anxiety     Arthritis     Depression     Fracture 1996    Hyperlipidemia     Hypothyroidism     Thyroid disease     Dr. Thiago Oviedo      Past Surgical History:   Procedure Laterality Date    ANKLE SURGERY      APPENDECTOMY      HYSTERECTOMY (CERVIX STATUS UNKNOWN)  1986    HYSTERECTOMY, TOTAL ABDOMINAL (CERVIX REMOVED)       Family History   Problem Relation Age of Onset    Breast Cancer Mother     Heart Attack Mother     Heart Attack Sister     Other Brother         HIV     Social History     Tobacco Use    Smoking status: Former     Packs/day: 0.50     Years: 38.00     Pack years: 19.00 Types: Cigarettes     Quit date: 3/13/2013     Years since quittin.8    Smokeless tobacco: Never    Tobacco comments:     uses electric cig   Substance Use Topics    Alcohol use: No     ALLERGIES:  No Known Allergies       Subjective   Review of Systems   Constitutional:  Negative for activity change, appetite change,unexpected weight change, chills, fever, and fatigue. HENT: Negative for ear pain, sore throat,  Rhinorrhea, sinus pain, sinus pressure, congestion. Eyes:  Negative for pain and discharge. Respiratory:  Negative for chest tightness, shortness of breath, wheezing, and cough. Cardiovascular:  Negative for chest pain, palpitations and leg swelling. Gastrointestinal: Negative for abdominal pain, blood in stool, constipation,diarrhea, nausea and vomiting. Endocrine: Negative for cold intolerance, heat intolerance, polydipsia, polyphagia and polyuria. Genitourinary: Negative for difficulty urinating, dysuria, flank pain, frequency, hematuria and urgency. Musculoskeletal: Negative for arthralgias, back pain, joint swelling, myalgias, neck pain and neck stiffness. Skin: Negative for rash and wound. Allergic/Immunologic: Negative for environmental allergies and food allergies. Neurological:  Negative for dizziness, light-headedness, numbness and headaches. Hematological:  Negative for adenopathy. Does not bruise/bleed easily. Psychiatric/Behavioral: Negative for self-injury, sleep disturbance and suicidal ideas. Objective   Physical Exam   PHYSICAL EXAM:   Constitutional: Srini Crain is oriented to person, place, and time. Vital signs are normal. Appears well-developed and well-nourished. Head: Normocephalic and atraumatic. Eyes:PERRL, EOMI, Conjunctiva normal, No discharge. Neck: Full passive range of motion. Non-tender on palpation. Neck supple. No thyromegaly present.  Trachea normal.  Cardiovascular: Normal rate, regular rhythm, S1, S2, no murmur, no gallop, no friction rub, intact distal pulses. Pulmonary/Chest: Breath sounds are clear throughout, No respiratory distress, No wheezing, No chest tenderness. Effort normal  Abdominal: Soft. Normal appearance, bowel sounds are present and normoactive. There is no hepatosplenomegaly. There is no tenderness. There is no CVA tenderness. Musculoskeletal: Extremities appear regular and symmetric. No evident masses, lesions, foreign bodies, or other abnormalities. No edema. No tenderness on palpation. Joints are stable. Full ROM, strength and tone are within normal limits. Neurological: Alert and oriented to person, place, and time. Normal motor function, Normal sensory function, No focal deficits noted. He has normal strength. Skin: Skin is warm, dry and intact. No obvious lesions on exposed skin  Psychiatric: Normal mood and affect. Speech is normal and behavior is normal.     Nursing note and vitals reviewed. Blood pressure 122/60, pulse 98, temperature (!) 96.6 °F (35.9 °C), weight 145 lb (65.8 kg), SpO2 99 %, not currently breastfeeding. Body mass index is 29.29 kg/m².     Wt Readings from Last 3 Encounters:   01/12/23 145 lb (65.8 kg)   07/12/22 151 lb (68.5 kg)   04/11/22 159 lb 3.2 oz (72.2 kg)     BP Readings from Last 3 Encounters:   01/12/23 122/60   07/12/22 136/78   04/11/22 128/81       Hospital Outpatient Visit on 07/12/2022   Component Date Value Ref Range Status    Cholesterol, Fasting 07/12/2022 206 (A)  <200 mg/dL Final    Comment:    Cholesterol Guidelines:      <200  Desirable   200-240  Borderline      >240  Undesirable         HDL 07/12/2022 58  >40 mg/dL Final    Comment:    HDL Guidelines:    <40     Undesirable   40-59    Borderline    >59     Desirable         LDL Cholesterol 07/12/2022 133 (A)  0 - 130 mg/dL Final    Comment:    LDL Guidelines:     <100    Desirable   100-129   Near to/above Desirable   130-159   Borderline      >159   Undesirable     Direct (measured) LDL and calculated LDL are not interchangeable tests. Chol/HDL Ratio 07/12/2022 3.6  <5 Final            Triglyceride, Fasting 07/12/2022 73  <150 mg/dL Final    Comment:    Triglyceride Guidelines:     <150   Desirable   150-199  Borderline   200-499  High     >499   Very high   Based on AHA Guidelines for fasting triglyceride, October 2012. No results found for this visit on 01/12/23. Completed Orders/Prescriptions   Orders Placed This Encounter   Medications    sertraline (ZOLOFT) 50 MG tablet     Sig: Take 1 tablet by mouth daily     Dispense:  90 tablet     Refill:  1    levothyroxine (SYNTHROID) 88 MCG tablet     Sig: Take 1 tablet by mouth daily     Dispense:  90 tablet     Refill:  1                 AssessmentPlan/Medical Decision Making     1. Hypothyroidism, unspecified type    - Follows with Endo  - levothyroxine (SYNTHROID) 88 MCG tablet; Take 1 tablet by mouth daily  Dispense: 90 tablet; Refill: 1    2. Osteopenia, unspecified location    - Stable  - CBC with Auto Differential; Future  - Comprehensive Metabolic Panel; Future    3. Mixed hyperlipidemia    - CBC with Auto Differential; Future  - Comprehensive Metabolic Panel; Future  - Lipid, Fasting; Future    4. Anxiety and depression    - Stable  - CBC with Auto Differential; Future  - Comprehensive Metabolic Panel; Future  - sertraline (ZOLOFT) 50 MG tablet; Take 1 tablet by mouth daily  Dispense: 90 tablet; Refill: 1    Return in about 6 months (around 7/12/2023) for [address] [place] [address]. 1.  Allene Clock received counseling on the following healthy behaviors: nutrition, exercise, and medication adherence  2. Patient given educational materials - see patient instructions  3. Was a self-tracking handout given in paper form or via [employer]? No  If yes, see orders or list here. 4.  Discussed use, benefit, and side effects of prescribed medications. Barriers to medication compliance addressed. All patient questions answered. Pt voiced understanding.    5.  Reviewed prior labs, imaging, consultation, follow up, and health maintenance  6.  Continue current medications, diet and exercise.  7. Discussed use, benefit, and side effects of prescribed medications.  Barriers to medication compliance addressed.  All her questions were answered.  Pt voiced understanding. Bonny will continue current medications, diet and exercise.      Of the  30  minute duration appointment visit, Annamarie Salomon CNP spent at least 50% of the face-to-face time in counseling, explanation of diagnosis, planning of further management, and answering all questions.          Signed:  Annamarie Salomon CNP

## 2023-03-12 DIAGNOSIS — M85.80 OSTEOPENIA, UNSPECIFIED LOCATION: ICD-10-CM

## 2023-03-13 RX ORDER — ALENDRONATE SODIUM 70 MG/1
TABLET ORAL
Qty: 12 TABLET | Refills: 1 | Status: SHIPPED | OUTPATIENT
Start: 2023-03-13

## 2023-03-13 NOTE — TELEPHONE ENCOUNTER
Indu Vera is calling to request a refill on the following medication(s):    Medication Request:  Requested Prescriptions     Pending Prescriptions Disp Refills    alendronate (FOSAMAX) 70 MG tablet [Pharmacy Med Name: ALENDRONATE SODIUM 70 MG TAB] 12 tablet 1     Sig: TAKE 1 TABLET BY MOUTH ONE TIME PER WEEK       Last Visit Date (If Applicable):  2/84/2740    Next Visit Date:    7/12/2023

## 2023-05-22 ENCOUNTER — HOSPITAL ENCOUNTER (OUTPATIENT)
Dept: MAMMOGRAPHY | Age: 68
Discharge: HOME | End: 2023-05-24
Payer: MEDICARE

## 2023-05-22 DIAGNOSIS — Z12.31 ENCOUNTER FOR SCREENING MAMMOGRAM FOR BREAST CANCER: ICD-10-CM

## 2023-05-22 PROCEDURE — 77063 BREAST TOMOSYNTHESIS BI: CPT

## 2023-06-19 ENCOUNTER — OFFICE VISIT (OUTPATIENT)
Dept: FAMILY MEDICINE CLINIC | Age: 68
End: 2023-06-19
Payer: MEDICARE

## 2023-06-19 VITALS
HEART RATE: 88 BPM | OXYGEN SATURATION: 97 % | HEIGHT: 60 IN | BODY MASS INDEX: 28.47 KG/M2 | TEMPERATURE: 97.4 F | WEIGHT: 145 LBS | DIASTOLIC BLOOD PRESSURE: 82 MMHG | SYSTOLIC BLOOD PRESSURE: 134 MMHG

## 2023-06-19 DIAGNOSIS — L23.7 ALLERGIC CONTACT DERMATITIS DUE TO PLANTS, EXCEPT FOOD: Primary | ICD-10-CM

## 2023-06-19 PROCEDURE — G8399 PT W/DXA RESULTS DOCUMENT: HCPCS

## 2023-06-19 PROCEDURE — 1090F PRES/ABSN URINE INCON ASSESS: CPT

## 2023-06-19 PROCEDURE — 1123F ACP DISCUSS/DSCN MKR DOCD: CPT

## 2023-06-19 PROCEDURE — 3017F COLORECTAL CA SCREEN DOC REV: CPT

## 2023-06-19 PROCEDURE — G8427 DOCREV CUR MEDS BY ELIG CLIN: HCPCS

## 2023-06-19 PROCEDURE — G8417 CALC BMI ABV UP PARAM F/U: HCPCS

## 2023-06-19 PROCEDURE — 99213 OFFICE O/P EST LOW 20 MIN: CPT

## 2023-06-19 PROCEDURE — 1036F TOBACCO NON-USER: CPT

## 2023-06-19 RX ORDER — TRIAMCINOLONE ACETONIDE 0.25 MG/G
CREAM TOPICAL
Qty: 80 G | Refills: 0 | Status: SHIPPED | OUTPATIENT
Start: 2023-06-19

## 2023-06-19 RX ORDER — PREDNISONE 10 MG/1
10 TABLET ORAL 2 TIMES DAILY
Qty: 10 TABLET | Refills: 0 | Status: SHIPPED | OUTPATIENT
Start: 2023-06-19 | End: 2023-06-24

## 2023-06-19 SDOH — ECONOMIC STABILITY: FOOD INSECURITY: WITHIN THE PAST 12 MONTHS, YOU WORRIED THAT YOUR FOOD WOULD RUN OUT BEFORE YOU GOT MONEY TO BUY MORE.: NEVER TRUE

## 2023-06-19 SDOH — ECONOMIC STABILITY: FOOD INSECURITY: WITHIN THE PAST 12 MONTHS, THE FOOD YOU BOUGHT JUST DIDN'T LAST AND YOU DIDN'T HAVE MONEY TO GET MORE.: NEVER TRUE

## 2023-06-19 SDOH — ECONOMIC STABILITY: HOUSING INSECURITY
IN THE LAST 12 MONTHS, WAS THERE A TIME WHEN YOU DID NOT HAVE A STEADY PLACE TO SLEEP OR SLEPT IN A SHELTER (INCLUDING NOW)?: NO

## 2023-06-19 SDOH — ECONOMIC STABILITY: INCOME INSECURITY: HOW HARD IS IT FOR YOU TO PAY FOR THE VERY BASICS LIKE FOOD, HOUSING, MEDICAL CARE, AND HEATING?: NOT HARD AT ALL

## 2023-06-19 ASSESSMENT — ENCOUNTER SYMPTOMS
VOMITING: 0
WHEEZING: 0
CHEST TIGHTNESS: 0
COLOR CHANGE: 0
CONSTIPATION: 0
SHORTNESS OF BREATH: 0
NAUSEA: 0
ABDOMINAL PAIN: 0
SORE THROAT: 0
COUGH: 0
DIARRHEA: 0
EYE DISCHARGE: 0

## 2023-06-19 ASSESSMENT — PATIENT HEALTH QUESTIONNAIRE - PHQ9
1. LITTLE INTEREST OR PLEASURE IN DOING THINGS: 0
6. FEELING BAD ABOUT YOURSELF - OR THAT YOU ARE A FAILURE OR HAVE LET YOURSELF OR YOUR FAMILY DOWN: 0
SUM OF ALL RESPONSES TO PHQ QUESTIONS 1-9: 0
5. POOR APPETITE OR OVEREATING: 0
SUM OF ALL RESPONSES TO PHQ QUESTIONS 1-9: 0
SUM OF ALL RESPONSES TO PHQ9 QUESTIONS 1 & 2: 0
2. FEELING DOWN, DEPRESSED OR HOPELESS: 0
7. TROUBLE CONCENTRATING ON THINGS, SUCH AS READING THE NEWSPAPER OR WATCHING TELEVISION: 0
4. FEELING TIRED OR HAVING LITTLE ENERGY: 0
8. MOVING OR SPEAKING SO SLOWLY THAT OTHER PEOPLE COULD HAVE NOTICED. OR THE OPPOSITE, BEING SO FIGETY OR RESTLESS THAT YOU HAVE BEEN MOVING AROUND A LOT MORE THAN USUAL: 0
10. IF YOU CHECKED OFF ANY PROBLEMS, HOW DIFFICULT HAVE THESE PROBLEMS MADE IT FOR YOU TO DO YOUR WORK, TAKE CARE OF THINGS AT HOME, OR GET ALONG WITH OTHER PEOPLE: 0
SUM OF ALL RESPONSES TO PHQ QUESTIONS 1-9: 0
SUM OF ALL RESPONSES TO PHQ QUESTIONS 1-9: 0
9. THOUGHTS THAT YOU WOULD BE BETTER OFF DEAD, OR OF HURTING YOURSELF: 0
3. TROUBLE FALLING OR STAYING ASLEEP: 0

## 2023-06-19 NOTE — PROGRESS NOTES
Lesa Matias (:  1955) is a 80 y.o. female,Established patient, here for evaluation of the following chief complaint(s):  Rash (All over body)          Subjective   SUBJECTIVE/OBJECTIVE:  Pt here today for rash  VSS    Pt reports she did yard work over the weekend and noticed some small welts that were itchy that began Saturday night  She applied a topical triple abx ointment and used rubbing alcohol on these spots  This helped alleviate some swelling but the itching persisted and new spots have developed    There is no blistering, no open sores, denies fevers/chills      Review of Systems   Constitutional:  Negative for activity change, appetite change, chills, fatigue, fever and unexpected weight change. HENT:  Negative for congestion, ear pain and sore throat. Eyes:  Negative for discharge and visual disturbance. Respiratory:  Negative for cough, chest tightness, shortness of breath and wheezing. Cardiovascular:  Negative for chest pain, palpitations and leg swelling. Gastrointestinal:  Negative for abdominal pain, constipation, diarrhea, nausea and vomiting. Genitourinary:  Negative for dysuria and pelvic pain. Musculoskeletal:  Negative for gait problem and neck pain. Skin:  Positive for rash. Negative for color change and wound. Neurological:  Negative for dizziness, seizures, syncope, weakness, light-headedness, numbness and headaches. Psychiatric/Behavioral:  Negative for behavioral problems and confusion. Objective   Physical Exam  Vitals and nursing note reviewed. Constitutional:       General: She is not in acute distress. Appearance: Normal appearance. She is well-developed. She is not ill-appearing, toxic-appearing or diaphoretic. HENT:      Head: Normocephalic and atraumatic. Right Ear: External ear normal.      Left Ear: External ear normal.      Nose: Nose normal.   Eyes:      General: No scleral icterus. Right eye: No discharge.

## 2023-06-26 ENCOUNTER — TELEPHONE (OUTPATIENT)
Dept: FAMILY MEDICINE CLINIC | Age: 68
End: 2023-06-26

## 2023-06-26 NOTE — TELEPHONE ENCOUNTER
PRN NEB GIVEN FOR WHEEZING.   SLIGHT IMPROVEMENT POST TX. Patient given your message and will keep using the steroid cream and will get an OTC allergy medication    Stephany Larson

## 2023-06-26 NOTE — TELEPHONE ENCOUNTER
Patient saw you last week for itchy blisters you gave her cream for it, pt states it's not helping still breaking out. They pop up then go down after the cream then starts itching again.     Please advise    Mary Morley

## 2023-07-12 ENCOUNTER — OFFICE VISIT (OUTPATIENT)
Dept: FAMILY MEDICINE CLINIC | Age: 68
End: 2023-07-12
Payer: MEDICARE

## 2023-07-12 ENCOUNTER — TELEPHONE (OUTPATIENT)
Dept: FAMILY MEDICINE CLINIC | Age: 68
End: 2023-07-12

## 2023-07-12 VITALS
DIASTOLIC BLOOD PRESSURE: 74 MMHG | BODY MASS INDEX: 27.73 KG/M2 | SYSTOLIC BLOOD PRESSURE: 130 MMHG | WEIGHT: 142 LBS | TEMPERATURE: 97.5 F | HEART RATE: 98 BPM | OXYGEN SATURATION: 99 %

## 2023-07-12 DIAGNOSIS — L08.9 SKIN INFECTION: ICD-10-CM

## 2023-07-12 DIAGNOSIS — Z00.00 INITIAL MEDICARE ANNUAL WELLNESS VISIT: Primary | ICD-10-CM

## 2023-07-12 PROCEDURE — G8417 CALC BMI ABV UP PARAM F/U: HCPCS | Performed by: NURSE PRACTITIONER

## 2023-07-12 PROCEDURE — 1036F TOBACCO NON-USER: CPT | Performed by: NURSE PRACTITIONER

## 2023-07-12 PROCEDURE — 1123F ACP DISCUSS/DSCN MKR DOCD: CPT | Performed by: NURSE PRACTITIONER

## 2023-07-12 PROCEDURE — G8427 DOCREV CUR MEDS BY ELIG CLIN: HCPCS | Performed by: NURSE PRACTITIONER

## 2023-07-12 PROCEDURE — G8399 PT W/DXA RESULTS DOCUMENT: HCPCS | Performed by: NURSE PRACTITIONER

## 2023-07-12 PROCEDURE — 99213 OFFICE O/P EST LOW 20 MIN: CPT | Performed by: NURSE PRACTITIONER

## 2023-07-12 PROCEDURE — 3017F COLORECTAL CA SCREEN DOC REV: CPT | Performed by: NURSE PRACTITIONER

## 2023-07-12 PROCEDURE — 1090F PRES/ABSN URINE INCON ASSESS: CPT | Performed by: NURSE PRACTITIONER

## 2023-07-12 PROCEDURE — G0438 PPPS, INITIAL VISIT: HCPCS | Performed by: NURSE PRACTITIONER

## 2023-07-12 RX ORDER — DOXYCYCLINE HYCLATE 100 MG
100 TABLET ORAL 2 TIMES DAILY
Qty: 14 TABLET | Refills: 0 | Status: SHIPPED | OUTPATIENT
Start: 2023-07-12 | End: 2023-07-19

## 2023-07-12 RX ORDER — MOMETASONE FUROATE 1 MG/G
CREAM TOPICAL
Qty: 45 G | Refills: 0 | Status: SHIPPED | OUTPATIENT
Start: 2023-07-12

## 2023-07-12 ASSESSMENT — PATIENT HEALTH QUESTIONNAIRE - PHQ9
SUM OF ALL RESPONSES TO PHQ QUESTIONS 1-9: 0
1. LITTLE INTEREST OR PLEASURE IN DOING THINGS: 0
4. FEELING TIRED OR HAVING LITTLE ENERGY: 0
7. TROUBLE CONCENTRATING ON THINGS, SUCH AS READING THE NEWSPAPER OR WATCHING TELEVISION: 0
SUM OF ALL RESPONSES TO PHQ9 QUESTIONS 1 & 2: 0
SUM OF ALL RESPONSES TO PHQ QUESTIONS 1-9: 0
SUM OF ALL RESPONSES TO PHQ QUESTIONS 1-9: 0
10. IF YOU CHECKED OFF ANY PROBLEMS, HOW DIFFICULT HAVE THESE PROBLEMS MADE IT FOR YOU TO DO YOUR WORK, TAKE CARE OF THINGS AT HOME, OR GET ALONG WITH OTHER PEOPLE: 0
6. FEELING BAD ABOUT YOURSELF - OR THAT YOU ARE A FAILURE OR HAVE LET YOURSELF OR YOUR FAMILY DOWN: 0
3. TROUBLE FALLING OR STAYING ASLEEP: 0
5. POOR APPETITE OR OVEREATING: 0
2. FEELING DOWN, DEPRESSED OR HOPELESS: 0
9. THOUGHTS THAT YOU WOULD BE BETTER OFF DEAD, OR OF HURTING YOURSELF: 0
SUM OF ALL RESPONSES TO PHQ QUESTIONS 1-9: 0
8. MOVING OR SPEAKING SO SLOWLY THAT OTHER PEOPLE COULD HAVE NOTICED. OR THE OPPOSITE, BEING SO FIGETY OR RESTLESS THAT YOU HAVE BEEN MOVING AROUND A LOT MORE THAN USUAL: 0

## 2023-07-12 ASSESSMENT — LIFESTYLE VARIABLES
HOW OFTEN DO YOU HAVE A DRINK CONTAINING ALCOHOL: MONTHLY OR LESS
HOW MANY STANDARD DRINKS CONTAINING ALCOHOL DO YOU HAVE ON A TYPICAL DAY: 1 OR 2

## 2023-07-12 NOTE — PROGRESS NOTES
Medicare Annual Wellness Visit    Ray Orellana is here for Medicare AWV    Assessment & Plan   Initial Medicare annual wellness visit  Skin infection  -     doxycycline hyclate (VIBRA-TABS) 100 MG tablet; Take 1 tablet by mouth 2 times daily for 7 days, Disp-14 tablet, R-0Normal  -     [place] OFFICE/OUTPATIENT ESTABLISHED LOW MDM 20-29 MIN  -     mometasone (ELOCON) 0.1 % cream; Apply topically daily. , Disp-45 g, R-0, Normal  Recommendations for Preventive Services Due: see orders and patient instructions/AVS.  Recommended screening schedule for the next 5-10 years is provided to the patient in written form: see Patient Instructions/AVS.     Return in about 1 week (around 7/19/2023) for f/u skin rash. Subjective     Patient reports a month ago she came into office and was seen by [facility] for rash. Reports she was given steroid cream and steroids. Says that it has improved but is not gone and she continues to get spots. Reports she has 2 this morning. She says originally she had been out in her yard cutting down bushes. Denies any fevers or chills or body aches. Patient's complete Health Risk Assessment and screening values have been reviewed and are found in Flowsheets. The following problems were reviewed today and where indicated follow up appointments were made and/or referrals ordered. Positive Risk Factor Screenings with Interventions:                    Vision Screen:  Do you have difficulty driving, watching TV, or doing any of your daily activities because of your eyesight?: No  Have you had an eye exam within the past year?: (!) No  No results found.     Interventions:   Patient encouraged to make appointment with their eye specialist    Safety:  Do you have either shower bars, grab bars, non-slip mats or non-slip surfaces in your shower or bathtub?: (!) No  Interventions:  Safety discussed     Advanced Directives:  Do you have a Living Will?: (!) No    Intervention:  has an advanced directive - a

## 2023-07-12 NOTE — PATIENT INSTRUCTIONS
Learning About Vision Tests  What are vision tests? The four most common vision tests are visual acuity tests, refraction, visual field tests, and color vision tests. Visual acuity (sharpness) tests  These tests are used: To see if you need glasses or contact lenses. To monitor an eye problem. To check an eye injury. Visual acuity tests are done as part of routine exams. You may also have this test when you get your 's license or apply for some types of jobs. Visual field tests  These tests are used: To check for vision loss in any area of your range of vision. To screen for certain eye diseases. To look for nerve damage after a stroke, head injury, or other problem that could reduce blood flow to the brain. Refraction and color tests  A refraction test is done to find the right prescription for glasses and contact lenses. A color vision test is done to check for color blindness. Color vision is often tested as part of a routine exam. You may also have this test when you apply for a job where recognizing different colors is important, such as , electronics, or the [employer]. How are vision tests done? Visual acuity test   You cover one eye at a time. You read aloud from a wall chart across the room. You read aloud from a small card that you hold in your hand. Refraction   You look into a special device. The device puts lenses of different strengths in front of each eye to see how strong your glasses or contact lenses need to be. Visual field tests   Your doctor may have you look through special machines. Or your doctor may simply have you stare straight ahead while they move a finger into and out of your field of vision. Color vision test   You look at pieces of printed test patterns in various colors. You say what number or symbol you see. Your doctor may have you trace the number or symbol using a pointer. How do these tests feel?   There is very little chance of

## 2023-07-12 NOTE — TELEPHONE ENCOUNTER
Patient states she took the doxycycline as prescribed and got really hot and started sweating afterwards. Should she discontinue use or is this a expected side affect? Please advise.

## 2023-07-19 ENCOUNTER — OFFICE VISIT (OUTPATIENT)
Dept: FAMILY MEDICINE CLINIC | Age: 68
End: 2023-07-19
Payer: MEDICARE

## 2023-07-19 VITALS
HEART RATE: 88 BPM | SYSTOLIC BLOOD PRESSURE: 130 MMHG | WEIGHT: 141 LBS | DIASTOLIC BLOOD PRESSURE: 70 MMHG | BODY MASS INDEX: 27.54 KG/M2 | OXYGEN SATURATION: 97 %

## 2023-07-19 DIAGNOSIS — Z09 FOLLOW-UP EXAM AFTER TREATMENT: Primary | ICD-10-CM

## 2023-07-19 DIAGNOSIS — L08.9 SKIN INFECTION: ICD-10-CM

## 2023-07-19 PROCEDURE — 1036F TOBACCO NON-USER: CPT | Performed by: NURSE PRACTITIONER

## 2023-07-19 PROCEDURE — 1090F PRES/ABSN URINE INCON ASSESS: CPT | Performed by: NURSE PRACTITIONER

## 2023-07-19 PROCEDURE — G8417 CALC BMI ABV UP PARAM F/U: HCPCS | Performed by: NURSE PRACTITIONER

## 2023-07-19 PROCEDURE — 1123F ACP DISCUSS/DSCN MKR DOCD: CPT | Performed by: NURSE PRACTITIONER

## 2023-07-19 PROCEDURE — G8399 PT W/DXA RESULTS DOCUMENT: HCPCS | Performed by: NURSE PRACTITIONER

## 2023-07-19 PROCEDURE — G8427 DOCREV CUR MEDS BY ELIG CLIN: HCPCS | Performed by: NURSE PRACTITIONER

## 2023-07-19 PROCEDURE — 99213 OFFICE O/P EST LOW 20 MIN: CPT | Performed by: NURSE PRACTITIONER

## 2023-07-19 PROCEDURE — 3017F COLORECTAL CA SCREEN DOC REV: CPT | Performed by: NURSE PRACTITIONER

## 2023-07-19 NOTE — PROGRESS NOTES
Kaylin Dominguezderick, [address]  [address] [facility]. Denilson Hilario, [address]  [phone]  [phone]    Mor Bird is a 80 y.o. female who is here with c/o of:    Chief Complaint: Rash (Follow up)      Patient Accompanied by: n/a    HPI - Mor Bird is here today with c/o:    Patient here for follow up skin infection. Completed antibiotics. Reports feelings better. Says she had a couple more lesions show up but she put the steroid cream on and it helped. She says they do not itch anymore.     Health Maintenance Due   Topic Date Due    Shingles vaccine (1 of 2) Never done    COVID-19 Vaccine (3 - Booster for Moderna series) 06/08/2021        Patient Active Problem List:     Osteopenia     FH: breast cancer in first degree relative     Hx of thyroid nodule     Hypothyroidism     Electronic cigarette use     Vaginal dryness, menopausal     Anxiety and depression     Primary osteoarthritis of right knee     Mixed hyperlipidemia     Past Medical History:   Diagnosis Date    Anxiety     Arthritis     Depression     Fracture 1996    Hyperlipidemia     Hypothyroidism     Thyroid disease     Dr. Diana Law      Past Surgical History:   Procedure Laterality Date    ANKLE SURGERY      APPENDECTOMY      HYSTERECTOMY (CERVIX STATUS UNKNOWN)  1986    HYSTERECTOMY, TOTAL ABDOMINAL (CERVIX REMOVED)       Family History   Problem Relation Age of Onset    Breast Cancer Mother     Heart Attack Mother     Heart Attack Sister     Other Brother         HIV     Social History     Tobacco Use    Smoking status: Former     Packs/day: 0.50     Years: 38.00     Pack years: 19.00     Types: Cigarettes     Quit date: 3/13/2013     Years since quitting: 10.3    Smokeless tobacco: Never    Tobacco comments:     uses electric cig   Substance Use Topics    Alcohol use: No     ALLERGIES:  No Known Allergies       Subjective   Review of Systems   Constitutional:  Negative for activity change, appetite

## 2023-07-26 ENCOUNTER — TELEPHONE (OUTPATIENT)
Dept: FAMILY MEDICINE CLINIC | Age: 68
End: 2023-07-26

## 2023-07-26 NOTE — TELEPHONE ENCOUNTER
----- Message from Adriane Nguyen sent at 7/26/2023  2:14 PM EDT -----  Subject: Message to Provider    QUESTIONS  Information for Provider? Patient says she is doing much better and that   she feels she doesn't need to come back in, please advise.  ---------------------------------------------------------------------------  --------------  Billie [facility]  [phone]; OK to leave message on voicemail  ---------------------------------------------------------------------------  --------------  SCRIPT ANSWERS  Relationship to Patient?  Self

## 2023-08-29 DIAGNOSIS — E03.9 HYPOTHYROIDISM, UNSPECIFIED TYPE: ICD-10-CM

## 2023-08-29 DIAGNOSIS — F41.9 ANXIETY AND DEPRESSION: ICD-10-CM

## 2023-08-29 DIAGNOSIS — F32.A ANXIETY AND DEPRESSION: ICD-10-CM

## 2023-08-29 DIAGNOSIS — L08.9 SKIN INFECTION: ICD-10-CM

## 2023-08-29 RX ORDER — LEVOTHYROXINE SODIUM 88 UG/1
88 TABLET ORAL DAILY
Qty: 90 TABLET | Refills: 1 | Status: SHIPPED | OUTPATIENT
Start: 2023-08-29

## 2023-08-29 RX ORDER — LEVOTHYROXINE SODIUM 88 UG/1
88 TABLET ORAL DAILY
Qty: 90 TABLET | Refills: 1 | OUTPATIENT
Start: 2023-08-29

## 2023-08-29 RX ORDER — MOMETASONE FUROATE 1 MG/G
CREAM TOPICAL
Qty: 45 G | Refills: 0 | OUTPATIENT
Start: 2023-08-29

## 2023-08-29 NOTE — TELEPHONE ENCOUNTER
Rosario Kulkarni is calling to request a refill on the following medication(s):    Medication Request:  Requested Prescriptions     Pending Prescriptions Disp Refills    sertraline (ZOLOFT) 50 MG tablet 90 tablet 1     Sig: Take 1 tablet by mouth daily    levothyroxine (SYNTHROID) 88 MCG tablet 90 tablet 1     Sig: Take 1 tablet by mouth daily       Last Visit Date (If Applicable):  1/13/1329    Next Visit Date:    Visit date not found

## 2023-09-21 NOTE — TELEPHONE ENCOUNTER
----- Message from Alanna 143 sent at 4/7/2022 11:39 AM EDT -----  Subject: Referral Request    QUESTIONS   Reason for referral request? Patient is calling to schedule a mammogram.   She doesn't have any orders for one. Please reach out to patient once   order is ready   Has the physician seen you for this condition before? No   Preferred Specialist (if applicable)? Do you already have an appointment scheduled? No  Additional Information for Provider?   ---------------------------------------------------------------------------  --------------  CALL BACK INFO  What is the best way for the office to contact you? OK to leave message on   voicemail  Preferred Call Back Phone Number? [phone]  ---------------------------------------------------------------------------  --------------  SCRIPT ANSWERS  Relationship to Patient?  Self
Left detailed message on what to do with order if it need faxed or what info does she need/want to schedule
Order placed
00:00

## 2023-10-17 ENCOUNTER — TELEPHONE (OUTPATIENT)
Dept: FAMILY MEDICINE CLINIC | Age: 68
End: 2023-10-17

## 2023-10-17 DIAGNOSIS — Z83.49 FAMILY HISTORY OF AMYLOIDOSIS: Primary | ICD-10-CM

## 2023-10-17 NOTE — TELEPHONE ENCOUNTER
Patient called in stating that her brother just passe from amyloidosis and her sister was just diagnosed too. Patient would like to be tested to see if she has it too.

## 2023-11-07 ENCOUNTER — HOSPITAL ENCOUNTER (OUTPATIENT)
Age: 68
Setting detail: SPECIMEN
Discharge: HOME | End: 2023-11-07
Payer: MEDICARE

## 2023-11-07 ENCOUNTER — TELEPHONE (OUTPATIENT)
Dept: ONCOLOGY | Age: 68
End: 2023-11-07

## 2023-11-07 ENCOUNTER — INITIAL CONSULT (OUTPATIENT)
Dept: ONCOLOGY | Age: 68
End: 2023-11-07
Payer: MEDICARE

## 2023-11-07 VITALS
DIASTOLIC BLOOD PRESSURE: 82 MMHG | HEART RATE: 98 BPM | SYSTOLIC BLOOD PRESSURE: 150 MMHG | WEIGHT: 139.5 LBS | HEIGHT: 59 IN | TEMPERATURE: 97.8 F | RESPIRATION RATE: 16 BRPM | BODY MASS INDEX: 28.12 KG/M2

## 2023-11-07 DIAGNOSIS — Z72.0 TOBACCO ABUSE: ICD-10-CM

## 2023-11-07 DIAGNOSIS — Z83.49 FAMILY HISTORY OF AMYLOIDOSIS: Primary | ICD-10-CM

## 2023-11-07 DIAGNOSIS — Z71.6 TOBACCO ABUSE COUNSELING: ICD-10-CM

## 2023-11-07 DIAGNOSIS — Z83.49 FAMILY HISTORY OF AMYLOIDOSIS: ICD-10-CM

## 2023-11-07 LAB
FREE KAPPA/LAMBDA RATIO: 1.5 (ref 0.26–1.65)
IGA SERPL-MCNC: 181 MG/DL (ref 70–400)
IGG SERPL-MCNC: 1104 MG/DL (ref 700–1600)
IGM SERPL-MCNC: 68 MG/DL (ref 40–230)
KAPPA LC FREE SER-MCNC: 17.4 MG/L (ref 3.7–19.4)
LAMBDA LC FREE SERPL-MCNC: 11.6 MG/L (ref 5.7–26.3)
LDH SERPL-CCNC: 204 U/L (ref 135–214)

## 2023-11-07 PROCEDURE — 83521 IG LIGHT CHAINS FREE EACH: CPT

## 2023-11-07 PROCEDURE — 99202 OFFICE O/P NEW SF 15 MIN: CPT | Performed by: INTERNAL MEDICINE

## 2023-11-07 PROCEDURE — 83615 LACTATE (LD) (LDH) ENZYME: CPT

## 2023-11-07 PROCEDURE — G8417 CALC BMI ABV UP PARAM F/U: HCPCS | Performed by: INTERNAL MEDICINE

## 2023-11-07 PROCEDURE — 84156 ASSAY OF PROTEIN URINE: CPT

## 2023-11-07 PROCEDURE — 1090F PRES/ABSN URINE INCON ASSESS: CPT | Performed by: INTERNAL MEDICINE

## 2023-11-07 PROCEDURE — 86335 IMMUNFIX E-PHORSIS/URINE/CSF: CPT

## 2023-11-07 PROCEDURE — 86334 IMMUNOFIX E-PHORESIS SERUM: CPT

## 2023-11-07 PROCEDURE — 82784 ASSAY IGA/IGD/IGG/IGM EACH: CPT

## 2023-11-07 PROCEDURE — G8484 FLU IMMUNIZE NO ADMIN: HCPCS | Performed by: INTERNAL MEDICINE

## 2023-11-07 PROCEDURE — 99205 OFFICE O/P NEW HI 60 MIN: CPT | Performed by: INTERNAL MEDICINE

## 2023-11-07 PROCEDURE — 36415 COLL VENOUS BLD VENIPUNCTURE: CPT

## 2023-11-07 PROCEDURE — G8427 DOCREV CUR MEDS BY ELIG CLIN: HCPCS | Performed by: INTERNAL MEDICINE

## 2023-11-07 NOTE — PROGRESS NOTES
_           Ms. Katelyn Landa is a very pleasant 80 y.o. female with history of multiple co morbidities as listed. PMH significant of hypothyroidism, depression and osteopenia is referred to us for screening of amyloidosis. She reports that she is healthy and is active physically. Denies any active complaints. No chest discomfort, shortness of breath, orthopnea or pedal edema. No numbness or tingling. No liver or kidney disease. No fevers or night sweats. She has good appetite and stable weight. Her brother  recently from complications related to amyloidosis at the age of 79 years. She does not know details about it but she was told to get herself evaluated. She denies any personal or family history of hematological disorder. Her mother had history of breast cancer. She is a former smoker, smoked about half a pack for 10+ years. Currently she vapes. Denies any excessive use of alcohol. No drug usage. PAST MEDICAL HISTORY: has a past medical history of Anxiety, Arthritis, Depression, Fracture, Hyperlipidemia, Hypothyroidism, and Thyroid disease. PAST SURGICAL HISTORY: has a past surgical history that includes Hysterectomy (); Appendectomy; Ankle surgery; and Hysterectomy, total abdominal.     CURRENT MEDICATIONS:  has a current medication list which includes the following prescription(s): sertraline, levothyroxine, alendronate, and vitamin d. ALLERGIES:  has No Known Allergies. FAMILY HISTORY: Negative for any hematological or oncological conditions. SOCIAL HISTORY:  reports that she quit smoking about 10 years ago. Her smoking use included cigarettes. She has a 19.00 pack-year smoking history. She has never used smokeless tobacco. She reports that she does not drink alcohol and does not use drugs. REVIEW OF SYSTEMS:     General: Positive for weakness and fatigue.  No unanticipated weight

## 2023-11-07 NOTE — TELEPHONE ENCOUNTER
[address] soon  Call with results  LABS LD, IMMUNOFIXATION URINE RANDOM PROFILE, KAPPA/ LAMBDA LT CHAINS, IMMUNOGLOBULIN PANEL IGG, IGA, IGM SIFX DONE ON EXIT  TRIAGE TO CALL PT W/ RESULTS  AVS PRINTED W/ INSTRUCTIONS AND GIVEN TO PT ON EXIT

## 2023-11-08 LAB
INTERPRETATION SERPL IFE-IMP: NORMAL
PATH REV: NORMAL
SPECIMEN TYPE: NORMAL
SPECIMEN VOL UR: NORMAL ML
URINE IFX INTERP: NORMAL
URINE TOTAL PROTEIN: 8 MG/DL

## 2023-11-16 ENCOUNTER — TELEPHONE (OUTPATIENT)
Dept: INFUSION THERAPY | Facility: MEDICAL CENTER | Age: 68
End: 2023-11-16

## 2023-11-17 NOTE — TELEPHONE ENCOUNTER
Voicemail left for patient informing her that per MD, all labs are fine, there is no amyloidosis and no myeloma. Patient may RTC PRN.

## 2024-01-25 DIAGNOSIS — L23.7 ALLERGIC CONTACT DERMATITIS DUE TO PLANTS, EXCEPT FOOD: ICD-10-CM

## 2024-01-25 DIAGNOSIS — M85.80 OSTEOPENIA, UNSPECIFIED LOCATION: ICD-10-CM

## 2024-01-25 RX ORDER — TRIAMCINOLONE ACETONIDE 0.25 MG/G
CREAM TOPICAL
Qty: 80 G | Refills: 0 | OUTPATIENT
Start: 2024-01-25

## 2024-01-25 RX ORDER — ALENDRONATE SODIUM 70 MG/1
TABLET ORAL
Qty: 12 TABLET | Refills: 1 | Status: SHIPPED | OUTPATIENT
Start: 2024-01-25

## 2024-01-25 NOTE — TELEPHONE ENCOUNTER
Bonny Sellers is calling to request a refill on the following medication(s):    Medication Request:  Requested Prescriptions     Pending Prescriptions Disp Refills    alendronate (FOSAMAX) 70 MG tablet [Pharmacy Med Name: ALENDRONATE SODIUM 70 MG TAB] 12 tablet 1     Sig: TAKE 1 TABLET BY MOUTH ONE TIME PER WEEK     Refused Prescriptions Disp Refills    triamcinolone (KENALOG) 0.025 % cream [Pharmacy Med Name: TRIAMCINOLONE 0.025% CREAM] 80 g 0     Sig: APPLY TO AFFECTED AREA TWICE A DAY       Last Visit Date (If Applicable):  6/19/2023    Next Visit Date:    Visit date not found

## 2024-01-26 ENCOUNTER — HOSPITAL ENCOUNTER (OUTPATIENT)
Dept: GENERAL RADIOLOGY | Age: 69
End: 2024-01-26
Payer: MEDICARE

## 2024-01-26 ENCOUNTER — OFFICE VISIT (OUTPATIENT)
Dept: FAMILY MEDICINE CLINIC | Age: 69
End: 2024-01-26

## 2024-01-26 ENCOUNTER — HOSPITAL ENCOUNTER (OUTPATIENT)
Age: 69
End: 2024-01-26
Payer: MEDICARE

## 2024-01-26 VITALS
TEMPERATURE: 97 F | WEIGHT: 133 LBS | BODY MASS INDEX: 26.86 KG/M2 | OXYGEN SATURATION: 98 % | DIASTOLIC BLOOD PRESSURE: 60 MMHG | SYSTOLIC BLOOD PRESSURE: 138 MMHG | HEART RATE: 120 BPM

## 2024-01-26 DIAGNOSIS — M54.32 SCIATICA OF LEFT SIDE: ICD-10-CM

## 2024-01-26 DIAGNOSIS — F32.A ANXIETY AND DEPRESSION: ICD-10-CM

## 2024-01-26 DIAGNOSIS — F41.9 ANXIETY AND DEPRESSION: ICD-10-CM

## 2024-01-26 DIAGNOSIS — M54.32 SCIATICA OF LEFT SIDE: Primary | ICD-10-CM

## 2024-01-26 PROCEDURE — 72100 X-RAY EXAM L-S SPINE 2/3 VWS: CPT

## 2024-01-26 RX ORDER — METHYLPREDNISOLONE 4 MG/1
TABLET ORAL
Qty: 1 KIT | Refills: 0 | Status: SHIPPED | OUTPATIENT
Start: 2024-01-26 | End: 2024-02-01

## 2024-01-26 ASSESSMENT — PATIENT HEALTH QUESTIONNAIRE - PHQ9
7. TROUBLE CONCENTRATING ON THINGS, SUCH AS READING THE NEWSPAPER OR WATCHING TELEVISION: 0
SUM OF ALL RESPONSES TO PHQ QUESTIONS 1-9: 0
SUM OF ALL RESPONSES TO PHQ9 QUESTIONS 1 & 2: 0
8. MOVING OR SPEAKING SO SLOWLY THAT OTHER PEOPLE COULD HAVE NOTICED. OR THE OPPOSITE, BEING SO FIGETY OR RESTLESS THAT YOU HAVE BEEN MOVING AROUND A LOT MORE THAN USUAL: 0
5. POOR APPETITE OR OVEREATING: 0
2. FEELING DOWN, DEPRESSED OR HOPELESS: 0
1. LITTLE INTEREST OR PLEASURE IN DOING THINGS: 0
3. TROUBLE FALLING OR STAYING ASLEEP: 0
SUM OF ALL RESPONSES TO PHQ QUESTIONS 1-9: 0
10. IF YOU CHECKED OFF ANY PROBLEMS, HOW DIFFICULT HAVE THESE PROBLEMS MADE IT FOR YOU TO DO YOUR WORK, TAKE CARE OF THINGS AT HOME, OR GET ALONG WITH OTHER PEOPLE: 0
4. FEELING TIRED OR HAVING LITTLE ENERGY: 0
6. FEELING BAD ABOUT YOURSELF - OR THAT YOU ARE A FAILURE OR HAVE LET YOURSELF OR YOUR FAMILY DOWN: 0
9. THOUGHTS THAT YOU WOULD BE BETTER OFF DEAD, OR OF HURTING YOURSELF: 0

## 2024-01-26 NOTE — PROGRESS NOTES
DOSEPACK) 4 MG tablet     Sig: Take by mouth.     Dispense:  1 kit     Refill:  0               AssessmentPlan/Medical Decision Making     1. Sciatica of left side    - methylPREDNISolone (MEDROL DOSEPACK) 4 MG tablet; Take by mouth.  Dispense: 1 kit; Refill: 0  - [facility] [place]  - XR LUMBAR SPINE (2-3 VIEWS); Future      Return in about 3 weeks (around 2/16/2024) for f/u sciatica.    1.  Bonny received counseling on the following healthy behaviors: n/a  2.  Patient given educational materials - see patient instructions  3.  Was a self-tracking handout given in paper form or via edot? No  If yes, see orders or list here.  4.  Discussed use, benefit, and side effects of prescribed medications.  Barriers to medication compliance addressed.  All patient questions answered.  Pt voiced understanding.   5.  Reviewed prior labs, imaging, consultation, follow up, and health maintenance  6.  Continue current medications, diet and exercise.  7. Discussed use, benefit, and side effects of prescribed medications.  Barriers to medication compliance addressed.  All her questions were answered.  Pt voiced understanding. Bonny will continue current medications, diet and exercise.      Of the  30  minute duration appointment visit, Annamarie Salomon CNP spent at least 50% of the face-to-face time in counseling, explanation of diagnosis, planning of further management, and answering all questions.          Signed:  Annamarie Salomon CNP

## 2024-01-26 NOTE — TELEPHONE ENCOUNTER
Bonny Sellers is calling to request a refill on the following medication(s):    Medication Request:  Requested Prescriptions     Pending Prescriptions Disp Refills    sertraline (ZOLOFT) 50 MG tablet 90 tablet 1     Sig: Take 1 tablet by mouth daily       Last Visit Date (If Applicable):  1/26/2024    Next Visit Date:    2/15/2024

## 2024-02-05 ENCOUNTER — HOSPITAL ENCOUNTER (OUTPATIENT)
Dept: PHYSICAL THERAPY | Facility: CLINIC | Age: 69
Setting detail: THERAPIES SERIES
Discharge: HOME | End: 2024-02-05
Payer: MEDICARE

## 2024-02-05 PROCEDURE — 97161 PT EVAL LOW COMPLEX 20 MIN: CPT

## 2024-02-05 PROCEDURE — 97110 THERAPEUTIC EXERCISES: CPT

## 2024-02-05 NOTE — FLOWSHEET NOTE
Susu Fall Risk Assessment    Patient Name:  Bonny Sellers  : 1955    Risk Factor Scale  Score   History of Falls [x] Yes  [] No 25  0 25   Secondary Diagnosis [] Yes  [x] No 15  0 0   Ambulatory Aid [] Furniture  [] Crutches/cane/walker  [x] None/bedrest/wheelchair/nurse 30  15  0 0   IV/Heparin Lock [] Yes  [x] No 20  0 0   Gait/Transferring [] Impaired  [x] Weak  [] Normal/bedrest/immobile 20  10  0 10   Mental Status [] Forgets limitations  [x] Oriented to own ability 15  0 0      Total: 35     Based on the Assessment score: check the appropriate box.    []  No intervention needed   Low =   Score of 0-24    [x]  Use standard prevention interventions Moderate =  Score of 24-44   [x] Give patient handout and discuss fall prevention strategies   [x] Establish goal of education for patient/family RE: fall prevention strategies    []  Use high risk prevention interventions High = Score of 45 and higher   [] Give patient handout and discuss fall prevention strategies   [] Establish goal of education for patient/family Re: fall prevention strategies   [] Discuss lifeline / other resources    Electronically signed by:   Sara Valles PT  Date: 2024

## 2024-02-05 NOTE — CONSULTS
Aquatics     []  Vasocompression     []  Other       TOTAL TREATMENT TIME: 46 mins    Time in: 10:05 am       Time out: 10:58 am    Electronically signed by: Sara Valles PT        Physician Signature:________________________________Date:__________________  By signing above or cosigning this note, I have reviewed this plan of care and certify a need for medically necessary rehabilitation services.     *PLEASE SIGN ABOVE AND FAX BACK ALL PAGES*

## 2024-02-05 NOTE — PLAN OF CARE
[] [facility]  Outpatient Rehabilitation &  Therapy  [address]  [phone]  [phone] [x] [facility]  Outpatient Rehabilitation &  Therapy  [address]  P: [phone]  F: [phone] [] [facility]  Outpatient Rehabilitation &  Therapy  [address]  P: [phone]  F: [phone] [] [facility]  Outpatient Rehabilitation &  Therapy  [address]  [phone]  [phone] [] [facility]  Outpatient Rehabilitation &  Therapy  [address]   P: [phone]  F: [phone]  [] [facility]  Outpatient Rehabilitation &  Therapy  [address]  P: [phone]  F: [phone] [] [facility]  Outpatient Rehabilitation &  Therapy  [address]  P: [phone]  F: [phone] [] [facility]  Outpatient Rehabilitation &  Therapy  [address]  P: [phone]  F: [phone] [] [facility]  Outpatient Rehabilitation &  Therapy  [address]  P: [phone]  F: [phone]  [] [facility] Outpatient Rehabilitation &  Therapy  [address]  P: [phone]  F: [phone]       Physical Therapy Plan of Care    Date:  2024  Patient: Bonny Sellers  : 1955  MRN: [id number]  Physician: Annamarie Salomon APRN - [facility]                               [place] [place]  Medical Diagnosis: M54.32 (ICD-10-CM) - Sciatica of left side   Rehab Codes: M25.652, R29.3, M62.81, M54.32, R26.81  Onset Date: 24                 Next Dr.'s appt.: 2/15/24          Subjective:   Pt arrived to physical therapy with c/o LLE pain present about 1 month ago without known cause.  Pt described pain as \"hurt\" with pins/needle.  Pt reported has to sit on R side to avoid putting pressure on L glut to reduce pain symptoms.

## 2024-02-08 ENCOUNTER — HOSPITAL ENCOUNTER (OUTPATIENT)
Dept: PHYSICAL THERAPY | Facility: CLINIC | Age: 69
Setting detail: THERAPIES SERIES
Discharge: HOME | End: 2024-02-08
Payer: MEDICARE

## 2024-02-08 PROCEDURE — 97110 THERAPEUTIC EXERCISES: CPT

## 2024-02-08 NOTE — FLOWSHEET NOTE
all directions without BLE instability and reduced pain/tightness in LLE to demo improved tissue extensibility and lumbopelvic rhythm   Pt to be able to perform 5xSTS in <14sec without BUE A and no signs of legs buckling to demo improved functional strength of BLE and overall endurance   Pt to ambulate at least 600ft with or without least restrictive AD with proper gait mechanics and no signs of global instability to reduce fall risk         Patient goals: back to normal     Pt. Education:  [x] Yes  [] No  [x] Reviewed Prior HEP/Ed  Method of Education: [x] Verbal  [] Demo  [x] Written  Comprehension of Education:  [x] Verbalizes understanding.  [x] Demonstrates understanding.  [] Needs review.  [x] Demonstrates/verbalizes HEP/Ed previously given.     Plan: [x] Continue current frequency toward long and short term goals.    [x] Specific Instructions for subsequent treatments: see above      Time In: 1:05 pm            Time Out: 2:00 pm    Electronically signed by:  Bryant Velazquez PT

## 2024-02-13 ENCOUNTER — HOSPITAL ENCOUNTER (OUTPATIENT)
Dept: PHYSICAL THERAPY | Facility: CLINIC | Age: 69
Setting detail: THERAPIES SERIES
Discharge: HOME | End: 2024-02-13
Payer: MEDICARE

## 2024-02-13 PROCEDURE — 97110 THERAPEUTIC EXERCISES: CPT

## 2024-02-13 NOTE — FLOWSHEET NOTE
[] [facility]  Outpatient Rehabilitation &  Therapy  [address]  [phone]  [phone] [x] [facility]  Outpatient Rehabilitation &  Therapy  [address]  P: [phone]  F: [phone] [] [facility]  Outpatient Rehabilitation &  Therapy  [address]  P: [phone]  F: [phone] [] [facility]  Outpatient Rehabilitation &  Therapy  [address]  [phone]  [phone] [] [facility]  Outpatient Rehabilitation &  Therapy  [address]   P: [phone]  F: [phone]  [] [facility]  Outpatient Rehabilitation &  Therapy  [address]  P: [phone]  F: [phone] [] [facility]  Outpatient Rehabilitation &  Therapy  [address]  P: [phone]  F: [phone] [] [facility]  Outpatient Rehabilitation &  Therapy  [address]  P: [phone]  F: [phone] [] [facility]  Outpatient Rehabilitation &  Therapy  [address]  P: [phone]  F: [phone]  [] [facility] Outpatient Rehabilitation &  Therapy  [address]  P: [phone]  F: [phone]     Physical Therapy Daily Treatment Note    Date:  2024  Patient Name:  Bonny Sellers    :  1955  MRN: [id number]  Physician: Annamarie Salomon APRN - [facility]UR[facility]                               Insurance: MEDICARE (BOMN)/[facility] [place]  Medical Diagnosis: M54.32 (ICD-10-CM) - Sciatica of left side   Rehab Codes: M25.652, R29.3, M62.81, M54.32, R26.81  Onset Date: 24                 Next 's appt.: 2/15/24  Visit# / total visits: 3/10; Progress note for Medicare patient due at visit 10     Cancels/No Shows: 0/0    Subjective:    Pain:  [x] Yes  [] No Location: Left glute Pain Rating: (0-10 scale) 4/10  Pain altered Tx:  [x] No  [] Yes

## 2024-02-15 ENCOUNTER — OFFICE VISIT (OUTPATIENT)
Dept: FAMILY MEDICINE CLINIC | Age: 69
End: 2024-02-15

## 2024-02-15 VITALS
HEART RATE: 93 BPM | BODY MASS INDEX: 27.06 KG/M2 | SYSTOLIC BLOOD PRESSURE: 130 MMHG | WEIGHT: 134 LBS | TEMPERATURE: 96.9 F | OXYGEN SATURATION: 99 % | DIASTOLIC BLOOD PRESSURE: 78 MMHG

## 2024-02-15 DIAGNOSIS — Z09 FOLLOW-UP EXAM AFTER TREATMENT: Primary | ICD-10-CM

## 2024-02-15 DIAGNOSIS — M54.32 SCIATICA OF LEFT SIDE: ICD-10-CM

## 2024-02-15 NOTE — PROGRESS NOTES
Annamarie Salomon, [facility]  [address]  [place]  [address]  [phone]  [phone]    Bonny ONEILL Verito is a 69 y.o. female who is here with c/o of:    Chief Complaint: Other (Recheck on Sciatica )      Patient Accompanied by: n/a    KEISHA - Bonny ONEILL Verito is here today with c/o:    Patient here for re evaluation for sciatic pain. Reports feeling better. Completed steroids and says that helped a lot. She is currently in PT.     She says that when she is up and moving she feels good. She says when she puts pressure on it when sitting or laying she can feel the discomfort but not having a lot of pain when standing like she was.       Health Maintenance Due   Topic Date Due    Pneumococcal 65+ years Vaccine (2 - PCV) 01/11/2021    COVID-19 Vaccine (3 - 2023-24 season) 09/01/2023        Patient Active Problem List:     Osteopenia     FH: breast cancer in first degree relative     Hx of thyroid nodule     Hypothyroidism     Electronic cigarette use     Vaginal dryness, menopausal     Anxiety and depression     Primary osteoarthritis of right knee     Mixed hyperlipidemia     Past Medical History:   Diagnosis Date    Anxiety     Arthritis     Depression     Fracture 1996    Hyperlipidemia     Hypothyroidism     Thyroid disease     Dr. Beham      Past Surgical History:   Procedure Laterality Date    ANKLE SURGERY      APPENDECTOMY      HYSTERECTOMY (CERVIX STATUS UNKNOWN)  1986    HYSTERECTOMY, TOTAL ABDOMINAL (CERVIX REMOVED)       Family History   Problem Relation Age of Onset    Breast Cancer Mother     Heart Attack Mother     Heart Attack Sister     Other Brother         HIV     Social History     Tobacco Use    Smoking status: Former     Current packs/day: 0.00     Average packs/day: 0.5 packs/day for 38.0 years (19.0 ttl pk-yrs)     Types: Cigarettes     Start date: 3/13/1975     Quit date: 3/13/2013     Years since quitting: 10.9    Smokeless tobacco: Never    Tobacco comments:

## 2024-02-16 ENCOUNTER — HOSPITAL ENCOUNTER (OUTPATIENT)
Dept: PHYSICAL THERAPY | Facility: CLINIC | Age: 69
Setting detail: THERAPIES SERIES
Discharge: HOME | End: 2024-02-16
Payer: MEDICARE

## 2024-02-16 PROCEDURE — 97110 THERAPEUTIC EXERCISES: CPT

## 2024-02-16 NOTE — FLOWSHEET NOTE
lying. Pt without complaints of pain during treatment and 0/10 pain at end of session. Updated HEP to progress towards goals. Educated on DOMS. Will monitor sx's and progress core and B LE strengthening program to tolerance.     [] No change.     [] Other:  [x] Patient would continue to benefit from skilled physical therapy services in order to: manage pain, improve core and BLE strength, improve lumbar paraspinals and tissue extensibility, and promote static/dynamic balance in attempt to improve global stability with upright standing/walking/stairs activities to reduce risk for falling and to improve tolerance with daily physical activities.      Problems:    [x] ? Pain: 5-10/10 LLE (gluteal to ankle)  [x] ? ROM: LLE hamstrings, piriformis, B hip flexors/quad, B gastroc/soleus, lumbar paraspinals  [x] ? Strength: core stabilizers, BLE throughout  [x] ? Function: Oswestry 48% impaired, LEFI 51.25% impaired  [x] ? Static/dynamic standing balance        STG: (to be met in 6 treatments)  Pt will reduce pain to less than or equal to 5/10 with ADL   Pt will be able to achieve at least 25° L 90/90 SLR to demo improved L hamstrings flexibility to ease difficulty with daily ambulation   Pt to be able to perform 5xSTS without sign of instability/knee buckling to demo improved functional strength and stability of BLE   Pt to improve B hip strength to at least 4-/5 throughout to ease difficulty with transfers and stairs negotiation at home   Pt to be able to ambulate at least 300ft with or without least restrictive AD with proper heel-toe progression and step length without LLE buckling and general swaying/instability noted to demo reduced risk for falling   Patient to be independent with home exercise program as demonstrated by performance with correct form without cues.  Demonstrate Knowledge of fall prevention - provided & discussed 2/5/24      LTG: (to be met in 10 treatments)  Pt will improve Oswestry score to <30%

## 2024-02-20 ENCOUNTER — HOSPITAL ENCOUNTER (OUTPATIENT)
Dept: PHYSICAL THERAPY | Facility: CLINIC | Age: 69
Setting detail: THERAPIES SERIES
Discharge: HOME | End: 2024-02-20
Payer: MEDICARE

## 2024-02-20 PROCEDURE — 97112 NEUROMUSCULAR REEDUCATION: CPT

## 2024-02-20 PROCEDURE — 97110 THERAPEUTIC EXERCISES: CPT

## 2024-02-20 NOTE — FLOWSHEET NOTE
[] [facility]  Outpatient Rehabilitation &  Therapy  [address]  [phone]  [phone] [x] [facility]  Outpatient Rehabilitation &  Therapy  [address]  P: [phone]  F: [phone] [] [facility]  Outpatient Rehabilitation &  Therapy  [address]  P: [phone]  F: [phone] [] [facility]  Outpatient Rehabilitation &  Therapy  [address]  [phone]  [phone] [] [facility]  Outpatient Rehabilitation &  Therapy  [address]   P: [phone]  F: [phone]  [] [facility]  Outpatient Rehabilitation &  Therapy  [address]  P: [phone]  F: [phone] [] [facility]  Outpatient Rehabilitation &  Therapy  [address]  P: [phone]  F: [phone] [] [facility]  Outpatient Rehabilitation &  Therapy  [address]  P: [phone]  F: [phone] [] [facility]  Outpatient Rehabilitation &  Therapy  [address]  P: [phone]  F: [phone]  [] [facility] Outpatient Rehabilitation &  Therapy  [address]  P: [phone]  F: [phone]     Physical Therapy Daily Treatment Note    Date:  2024  Patient Name:  Bonny Sellers    :  1955  MRN: [id number]  Physician: Annamarie Salomon APRN - MAURA                               Insurance: MEDICARE (BOMN)/[facility]  Medical Diagnosis: M54.32 (ICD-10-CM) - Sciatica of left side   Rehab Codes: M25.652, R29.3, M62.81, M54.32, R26.81  Onset Date: 24                 Next 's appt.: 8/15/24  Visit# / total visits: 5/10; Progress note for Medicare patient due at visit 10     Cancels/No Shows: 0/0    Subjective:    Pain:  [x] Yes  [] No Location: Left glute/posterior thigh Pain Rating: (0-10 scale) 5-6/10 upon arrival   Pain altered

## 2024-02-21 ENCOUNTER — APPOINTMENT (OUTPATIENT)
Dept: PHYSICAL THERAPY | Facility: CLINIC | Age: 69
End: 2024-02-21
Payer: MEDICARE

## 2024-02-22 ENCOUNTER — HOSPITAL ENCOUNTER (OUTPATIENT)
Dept: PHYSICAL THERAPY | Facility: CLINIC | Age: 69
Setting detail: THERAPIES SERIES
Discharge: HOME | End: 2024-02-22
Payer: MEDICARE

## 2024-02-22 PROCEDURE — 97110 THERAPEUTIC EXERCISES: CPT

## 2024-02-22 NOTE — DISCHARGE SUMMARY
[] [facility]  Outpatient Rehabilitation &  Therapy  [address]  [phone]  [phone] [x] [facility]  Outpatient Rehabilitation &  Therapy  [address]  P: [phone]  F: [phone] [] [facility]  Outpatient Rehabilitation &  Therapy  [address]  P: [phone]  F: [phone] [] [facility]  Outpatient Rehabilitation &  Therapy  [address]  [phone]  [phone] [] [facility]  Outpatient Rehabilitation &  Therapy  [address]   P: [phone]  F: [phone]  [] [facility]  Outpatient Rehabilitation &  Therapy  [address]  P: [phone]  F: [phone] [] [facility]  Outpatient Rehabilitation &  Therapy  [address]  P: [phone]  F: [phone] [] [facility]  Outpatient Rehabilitation &  Therapy  [address]  P: [phone]  F: [phone] [] [facility]  Outpatient Rehabilitation &  Therapy  [address]  P: [phone]  F: [phone]  [] [facility] Outpatient Rehabilitation &  Therapy  [address]  P: [phone]  F: [phone]     Physical Therapy Discharge Note    Date: 2024      Patient: Bonny Sellers  : 1955  MRN: [id number]    Physician: Annamarie Salomon APRN - MAURA                               Insurance: MEDICARE (BOMN)/O  Medical Diagnosis: M54.32 (ICD-10-CM) - Sciatica of left side   Rehab Codes: M25.652, R29.3, M62.81, M54.32, R26.81  Onset Date: 24                 Next 's appt.: 8/15/24  Total visits attended: 6  Cancels/No shows: 0  Date of initial visit: 24                Date of final visit: 24       Subjective:    Pain:  [] Yes  [x] No   Location: L posterior thigh                  Pain Rating: (0-10 scale) 'very mild' not

## 2024-02-22 NOTE — FLOWSHEET NOTE
Demonstrates/verbalizes HEP/Ed previously given.     Plan: [] Continue current frequency toward long and short term goals.    [] Specific Instructions for subsequent treatments: see above        Time In: 10:03 am            Time Out: 11:00 am    Electronically signed by:  Sara Valles PT

## 2024-04-15 ENCOUNTER — TELEPHONE (OUTPATIENT)
Dept: FAMILY MEDICINE CLINIC | Age: 69
End: 2024-04-15

## 2024-04-15 ENCOUNTER — TELEPHONE (OUTPATIENT)
Dept: INFUSION THERAPY | Age: 69
End: 2024-04-15

## 2024-04-15 NOTE — TELEPHONE ENCOUNTER
Pt called back stating Dr. Davenport coded a bill incorrectly. Writer inquired further, and was able to figure out that labs she had completed with Dr. Davenport were denied by her insurance d/t medical necessity. She states Medicare is asking for medical necessity. Writer states we can appeal, but need the denial letter from insurance. She states there is not a denial letter, and she just spoke to her insurance on the phone who told her she needs to send in medical necessity. Writer states we can send in progress note which explains why the labs were ordered. She verbalized understanding and asked for the note to be mailed to her home address so she could send it in herself with some other documentation she has. Progress note printed and placed in out-going mail. Pt will call if anything further is needed.

## 2024-04-15 NOTE — TELEPHONE ENCOUNTER
PC to pt per Dr. Buckley notifying her that her labs are fine and no sign of myeloma. Pt voiced understanding but appeared more concerned about a recent bill that she said was coded improperly. Pt provided with number for medical billing.

## 2024-04-15 NOTE — TELEPHONE ENCOUNTER
Patient called asking to get a letter stating the medical reasoning for her labs she got done 11/07/2023 for the (Lactate Dehydrogenase ) so her insurance will cover it she said its because it says that it was routine but she said this was her first time and it was for a reason.

## 2024-04-17 ENCOUNTER — TELEPHONE (OUTPATIENT)
Dept: FAMILY MEDICINE CLINIC | Age: 69
End: 2024-04-17

## 2024-04-17 NOTE — TELEPHONE ENCOUNTER
Patient asking for something to calm her sciatica down. Doing exercises not helping much.    [facility] [address]

## 2024-04-19 ENCOUNTER — OFFICE VISIT (OUTPATIENT)
Dept: FAMILY MEDICINE CLINIC | Age: 69
End: 2024-04-19

## 2024-04-19 VITALS
BODY MASS INDEX: 27.79 KG/M2 | OXYGEN SATURATION: 99 % | DIASTOLIC BLOOD PRESSURE: 78 MMHG | HEART RATE: 105 BPM | TEMPERATURE: 97.3 F | SYSTOLIC BLOOD PRESSURE: 154 MMHG | WEIGHT: 137.6 LBS

## 2024-04-19 DIAGNOSIS — M54.32 SCIATICA OF LEFT SIDE: Primary | ICD-10-CM

## 2024-04-19 RX ORDER — TIZANIDINE 4 MG/1
4 TABLET ORAL 3 TIMES DAILY PRN
Qty: 30 TABLET | Refills: 0 | Status: SHIPPED | OUTPATIENT
Start: 2024-04-19 | End: 2024-04-29

## 2024-04-19 ASSESSMENT — ENCOUNTER SYMPTOMS: BACK PAIN: 1

## 2024-04-20 NOTE — PROGRESS NOTES
[facility]  [facility]  [address]  [facility] [address]     Date of Visit:  2024  Patient Name: Bonny Sellers   Patient :  1955     CHIEF COMPLAINT:     Bonny Sellers is a 69 y.o. female who presents today for an general visit to be evaluated for the following condition(s):  Chief Complaint   Patient presents with    Back Pain     Left leg and right on and off          REVIEW OF SYSTEM      Review of Systems   Musculoskeletal:  Positive for back pain.       HISTORY OF PRESENT ILLNESS     Back Pain      Patient is a 69-year-old female with a history of hypothyroidism, anxiety/depression, osteoporosis who presents to the office for left-sided sciatica pain.  Patient recently had sciatica about 2 to 3 months ago and was given steroids and physical therapy.  Patient states that she recovered and was a lot better however about a week and a half ago she slid on the handrail and she thinks he pulled a muscle.  Patient states that since that time she has been having spasms on her butt with a heaviness/tingling radiating down her leg.  Patient states that this sensation is familiar of the last time.  She has been doing the physical therapy exercises she has learned from previously and it has not been helping.  Patient would like some medication to help.  REVIEWED INFORMATION      No Known Allergies    Patient Active Problem List   Diagnosis    Osteopenia    FH: breast cancer in first degree relative    Hx of thyroid nodule    Hypothyroidism    Electronic cigarette use    Vaginal dryness, menopausal    Anxiety and depression    Primary osteoarthritis of right knee    Mixed hyperlipidemia    Sciatica of left side       Past Medical History:   Diagnosis Date    Anxiety     Arthritis     Depression     Fracture     Hyperlipidemia     Hypothyroidism     Thyroid disease     Dr. Beham       Past Surgical History:   Procedure Laterality Date    ANKLE SURGERY      APPENDECTOMY

## 2024-04-20 NOTE — ASSESSMENT & PLAN NOTE
A/P: Uncontrolled  - Advised to continue physical therapy exercises  - Exercises given  - Zanaflex given f for 10 days  - Tylenol or ibuprofen as needed  -Advised patient to contact us if no improvement in 10 days

## 2024-05-21 ENCOUNTER — TELEPHONE (OUTPATIENT)
Dept: FAMILY MEDICINE CLINIC | Age: 69
End: 2024-05-21

## 2024-05-21 DIAGNOSIS — Z80.3 FH: BREAST CANCER IN FIRST DEGREE RELATIVE: ICD-10-CM

## 2024-05-21 DIAGNOSIS — Z12.31 ENCOUNTER FOR SCREENING MAMMOGRAM FOR MALIGNANT NEOPLASM OF BREAST: Primary | ICD-10-CM

## 2024-05-23 ENCOUNTER — HOSPITAL ENCOUNTER (OUTPATIENT)
Dept: MAMMOGRAPHY | Age: 69
Discharge: HOME | End: 2024-05-25
Payer: MEDICARE

## 2024-05-23 VITALS — HEIGHT: 59 IN | BODY MASS INDEX: 27.62 KG/M2 | WEIGHT: 137 LBS

## 2024-05-23 DIAGNOSIS — Z80.3 FH: BREAST CANCER IN FIRST DEGREE RELATIVE: ICD-10-CM

## 2024-05-23 DIAGNOSIS — Z12.31 ENCOUNTER FOR SCREENING MAMMOGRAM FOR MALIGNANT NEOPLASM OF BREAST: ICD-10-CM

## 2024-05-23 PROCEDURE — 77063 BREAST TOMOSYNTHESIS BI: CPT

## 2024-06-03 DIAGNOSIS — M85.80 OSTEOPENIA, UNSPECIFIED LOCATION: ICD-10-CM

## 2024-06-04 RX ORDER — ALENDRONATE SODIUM 70 MG/1
TABLET ORAL
Qty: 12 TABLET | Refills: 1 | Status: SHIPPED | OUTPATIENT
Start: 2024-06-04

## 2024-06-04 NOTE — TELEPHONE ENCOUNTER
Bonny Sellers is calling to request a refill on the following medication(s):    Medication Request:  Requested Prescriptions     Pending Prescriptions Disp Refills    alendronate (FOSAMAX) 70 MG tablet 12 tablet 1       Last Visit Date (If Applicable):  6/19/2023    Next Visit Date:    Visit date not found

## 2024-07-12 DIAGNOSIS — F32.A ANXIETY AND DEPRESSION: ICD-10-CM

## 2024-07-12 DIAGNOSIS — F41.9 ANXIETY AND DEPRESSION: ICD-10-CM

## 2024-07-12 DIAGNOSIS — E03.9 HYPOTHYROIDISM, UNSPECIFIED TYPE: ICD-10-CM

## 2024-07-12 RX ORDER — LEVOTHYROXINE SODIUM 88 UG/1
88 TABLET ORAL DAILY
Qty: 90 TABLET | Refills: 1 | Status: SHIPPED | OUTPATIENT
Start: 2024-07-12

## 2024-07-12 NOTE — TELEPHONE ENCOUNTER
Bonny Sellers is calling to request a refill on the following medication(s):    Medication Request:  Requested Prescriptions     Pending Prescriptions Disp Refills    levothyroxine (SYNTHROID) 88 MCG tablet 90 tablet 1     Sig: Take 1 tablet by mouth daily    sertraline (ZOLOFT) 50 MG tablet 90 tablet 1     Sig: Take 1 tablet by mouth daily       Last Visit Date (If Applicable):  2/15/2024    Next Visit Date:    8/22/2024

## 2024-07-22 ENCOUNTER — TELEPHONE (OUTPATIENT)
Dept: FAMILY MEDICINE CLINIC | Age: 69
End: 2024-07-22

## 2024-07-22 ENCOUNTER — TELEPHONE (OUTPATIENT)
Dept: INFUSION THERAPY | Age: 69
End: 2024-07-22

## 2024-07-22 NOTE — TELEPHONE ENCOUNTER
Pt brought in a letter from Medicare stating a Immunologic Analysis was not covered due to code  Advised pt Dr Ceci Sarmiento Oncologist needs to change code to be covered  due to Family hx. ( Copy scanned in chart) pt will take letter to Oncologist she said thank you  
Detail Level: Zone
Initiate Treatment: Alternate In house Acne gel w/ Tretinoin 0.025% and 0.05% QHS until for one week, then switch to 0.05% QHS

## 2024-07-22 NOTE — TELEPHONE ENCOUNTER
Pt sent in letter stating labs that were done 11/7/23 were not covered by Medicare d/t it being coded incorrectly. Writer called Billing at [phone] and spoke to Elif. She states she will send this to the Coding Dept to update the code and resubmit. She states this was already paid by pt, so if insurance will cover it after resubmission, they will issue a refund. Pt updated.

## 2024-08-07 ENCOUNTER — TELEPHONE (OUTPATIENT)
Dept: FAMILY MEDICINE CLINIC | Age: 69
End: 2024-08-07

## 2024-08-07 NOTE — TELEPHONE ENCOUNTER
I would recommend rest, hydration, OTC cough/cold medications and tylenol for fevers/discomfort. No need to come to office.

## 2024-08-07 NOTE — TELEPHONE ENCOUNTER
The patient called in stating that she did a test at home and was positive for Covid and feels very sick. She was wondering if there is anything that you could prescribe for her without her coming in to office?

## 2024-08-22 ENCOUNTER — HOSPITAL ENCOUNTER (OUTPATIENT)
Age: 69
Setting detail: SPECIMEN
Discharge: HOME | End: 2024-08-22

## 2024-08-22 ENCOUNTER — OFFICE VISIT (OUTPATIENT)
Dept: FAMILY MEDICINE CLINIC | Age: 69
End: 2024-08-22
Payer: MEDICARE

## 2024-08-22 VITALS
SYSTOLIC BLOOD PRESSURE: 110 MMHG | BODY MASS INDEX: 27.11 KG/M2 | HEART RATE: 91 BPM | OXYGEN SATURATION: 99 % | DIASTOLIC BLOOD PRESSURE: 70 MMHG | TEMPERATURE: 97.7 F | WEIGHT: 134.2 LBS

## 2024-08-22 DIAGNOSIS — F41.9 ANXIETY AND DEPRESSION: ICD-10-CM

## 2024-08-22 DIAGNOSIS — E78.2 MIXED HYPERLIPIDEMIA: ICD-10-CM

## 2024-08-22 DIAGNOSIS — F32.A ANXIETY AND DEPRESSION: ICD-10-CM

## 2024-08-22 DIAGNOSIS — Z23 IMMUNIZATION DUE: ICD-10-CM

## 2024-08-22 DIAGNOSIS — Z00.00 MEDICARE ANNUAL WELLNESS VISIT, SUBSEQUENT: Primary | ICD-10-CM

## 2024-08-22 LAB
ALBUMIN SERPL-MCNC: 4.7 G/DL (ref 3.5–5.2)
ALBUMIN/GLOB SERPL: 2 {RATIO} (ref 1–2.5)
ALP SERPL-CCNC: 59 U/L (ref 35–104)
ALT SERPL-CCNC: 12 U/L (ref 10–35)
ANION GAP SERPL CALCULATED.3IONS-SCNC: 9 MMOL/L (ref 9–16)
AST SERPL-CCNC: 19 U/L (ref 10–35)
BASOPHILS # BLD: 0.05 K/UL (ref 0–0.2)
BASOPHILS NFR BLD: 1 % (ref 0–2)
BILIRUB SERPL-MCNC: 0.4 MG/DL (ref 0–1.2)
BUN SERPL-MCNC: 15 MG/DL (ref 8–23)
CALCIUM SERPL-MCNC: 9.8 MG/DL (ref 8.6–10.4)
CHLORIDE SERPL-SCNC: 106 MMOL/L (ref 98–107)
CHOLEST SERPL-MCNC: 203 MG/DL (ref 0–199)
CHOLESTEROL/HDL RATIO: 3
CO2 SERPL-SCNC: 26 MMOL/L (ref 20–31)
CREAT SERPL-MCNC: 1 MG/DL (ref 0.5–0.9)
EOSINOPHIL # BLD: 0.04 K/UL (ref 0–0.44)
EOSINOPHILS RELATIVE PERCENT: 1 % (ref 1–4)
ERYTHROCYTE [DISTWIDTH] IN BLOOD BY AUTOMATED COUNT: 14 % (ref 11.8–14.4)
GFR, ESTIMATED: 64 ML/MIN/1.73M2
GLUCOSE SERPL-MCNC: 98 MG/DL (ref 74–99)
HCT VFR BLD AUTO: 47.3 % (ref 36.3–47.1)
HDLC SERPL-MCNC: 70 MG/DL
HGB BLD-MCNC: 15 G/DL (ref 11.9–15.1)
IMM GRANULOCYTES # BLD AUTO: <0.03 K/UL (ref 0–0.3)
IMM GRANULOCYTES NFR BLD: 0 %
LDLC SERPL CALC-MCNC: 113 MG/DL (ref 0–100)
LYMPHOCYTES NFR BLD: 3.62 K/UL (ref 1.1–3.7)
LYMPHOCYTES RELATIVE PERCENT: 47 % (ref 24–43)
MCH RBC QN AUTO: 29.1 PG (ref 25.2–33.5)
MCHC RBC AUTO-ENTMCNC: 31.7 G/DL (ref 28.4–34.8)
MCV RBC AUTO: 91.8 FL (ref 82.6–102.9)
MONOCYTES NFR BLD: 0.35 K/UL (ref 0.1–1.2)
MONOCYTES NFR BLD: 5 % (ref 3–12)
NEUTROPHILS NFR BLD: 46 % (ref 36–65)
NEUTS SEG NFR BLD: 3.67 K/UL (ref 1.5–8.1)
NRBC BLD-RTO: 0 PER 100 WBC
PLATELET # BLD AUTO: 301 K/UL (ref 138–453)
PMV BLD AUTO: 10.7 FL (ref 8.1–13.5)
POTASSIUM SERPL-SCNC: 4.6 MMOL/L (ref 3.7–5.3)
PROT SERPL-MCNC: 7.7 G/DL (ref 6.6–8.7)
RBC # BLD AUTO: 5.15 M/UL (ref 3.95–5.11)
SODIUM SERPL-SCNC: 141 MMOL/L (ref 136–145)
TRIGL SERPL-MCNC: 100 MG/DL (ref 0–149)
TSH SERPL DL<=0.05 MIU/L-ACNC: 0.64 UIU/ML (ref 0.27–4.2)
VLDLC SERPL CALC-MCNC: 20 MG/DL
WBC OTHER # BLD: 7.7 K/UL (ref 3.5–11.3)

## 2024-08-22 PROCEDURE — G0439 PPPS, SUBSEQ VISIT: HCPCS | Performed by: NURSE PRACTITIONER

## 2024-08-22 PROCEDURE — 90677 PCV20 VACCINE IM: CPT | Performed by: NURSE PRACTITIONER

## 2024-08-22 PROCEDURE — 3017F COLORECTAL CA SCREEN DOC REV: CPT | Performed by: NURSE PRACTITIONER

## 2024-08-22 PROCEDURE — 1123F ACP DISCUSS/DSCN MKR DOCD: CPT | Performed by: NURSE PRACTITIONER

## 2024-08-22 PROCEDURE — G0009 ADMIN PNEUMOCOCCAL VACCINE: HCPCS | Performed by: NURSE PRACTITIONER

## 2024-08-22 SDOH — ECONOMIC STABILITY: FOOD INSECURITY: WITHIN THE PAST 12 MONTHS, YOU WORRIED THAT YOUR FOOD WOULD RUN OUT BEFORE YOU GOT MONEY TO BUY MORE.: NEVER TRUE

## 2024-08-22 SDOH — ECONOMIC STABILITY: FOOD INSECURITY: WITHIN THE PAST 12 MONTHS, THE FOOD YOU BOUGHT JUST DIDN'T LAST AND YOU DIDN'T HAVE MONEY TO GET MORE.: NEVER TRUE

## 2024-08-22 SDOH — ECONOMIC STABILITY: INCOME INSECURITY: HOW HARD IS IT FOR YOU TO PAY FOR THE VERY BASICS LIKE FOOD, HOUSING, MEDICAL CARE, AND HEATING?: NOT HARD AT ALL

## 2024-08-22 ASSESSMENT — LIFESTYLE VARIABLES
HOW MANY STANDARD DRINKS CONTAINING ALCOHOL DO YOU HAVE ON A TYPICAL DAY: 1 OR 2
HOW OFTEN DO YOU HAVE A DRINK CONTAINING ALCOHOL: MONTHLY OR LESS

## 2024-08-22 ASSESSMENT — PATIENT HEALTH QUESTIONNAIRE - PHQ9
8. MOVING OR SPEAKING SO SLOWLY THAT OTHER PEOPLE COULD HAVE NOTICED. OR THE OPPOSITE, BEING SO FIGETY OR RESTLESS THAT YOU HAVE BEEN MOVING AROUND A LOT MORE THAN USUAL: NOT AT ALL
4. FEELING TIRED OR HAVING LITTLE ENERGY: NOT AT ALL
5. POOR APPETITE OR OVEREATING: NOT AT ALL
7. TROUBLE CONCENTRATING ON THINGS, SUCH AS READING THE NEWSPAPER OR WATCHING TELEVISION: NOT AT ALL
1. LITTLE INTEREST OR PLEASURE IN DOING THINGS: NOT AT ALL
2. FEELING DOWN, DEPRESSED OR HOPELESS: NOT AT ALL
9. THOUGHTS THAT YOU WOULD BE BETTER OFF DEAD, OR OF HURTING YOURSELF: NOT AT ALL
10. IF YOU CHECKED OFF ANY PROBLEMS, HOW DIFFICULT HAVE THESE PROBLEMS MADE IT FOR YOU TO DO YOUR WORK, TAKE CARE OF THINGS AT HOME, OR GET ALONG WITH OTHER PEOPLE: NOT DIFFICULT AT ALL
SUM OF ALL RESPONSES TO PHQ QUESTIONS 1-9: 0
SUM OF ALL RESPONSES TO PHQ QUESTIONS 1-9: 0
SUM OF ALL RESPONSES TO PHQ9 QUESTIONS 1 & 2: 0
6. FEELING BAD ABOUT YOURSELF - OR THAT YOU ARE A FAILURE OR HAVE LET YOURSELF OR YOUR FAMILY DOWN: NOT AT ALL
SUM OF ALL RESPONSES TO PHQ QUESTIONS 1-9: 0
SUM OF ALL RESPONSES TO PHQ QUESTIONS 1-9: 0
3. TROUBLE FALLING OR STAYING ASLEEP: NOT AT ALL

## 2024-08-22 NOTE — PATIENT INSTRUCTIONS
to your doctor about stop-smoking programs and medicines. These can increase your chances of quitting for good. Quitting is one of the most important things you can do to protect your heart. It is never too late to quit. Try to avoid secondhand smoke too.     Stay at a weight that's healthy for you. Talk to your doctor if you need help losing weight.     Try to get 7 to 9 hours of sleep each night.     Limit alcohol to 2 drinks a day for men and 1 drink a day for women. Too much alcohol can cause health problems.     Manage other health problems such as diabetes, high blood pressure, and high cholesterol. If you think you may have a problem with alcohol or drug use, talk to your doctor.   Medicines    Take your medicines exactly as prescribed. Call your doctor if you think you are having a problem with your medicine.     If your doctor recommends aspirin, take the amount directed each day. Make sure you take aspirin and not another kind of pain reliever, such as acetaminophen (Tylenol).   When should you call for help?   Call 911 if you have symptoms of a heart attack. These may include:    Chest pain or pressure, or a strange feeling in the chest.     Sweating.     Shortness of breath.     Pain, pressure, or a strange feeling in the back, neck, jaw, or upper belly or in one or both shoulders or arms.     Lightheadedness or sudden weakness.     A fast or irregular heartbeat.   After you call 911, the  may tell you to chew 1 adult-strength or 2 to 4 low-dose aspirin. Wait for an ambulance. Do not try to drive yourself.  Watch closely for changes in your health, and be sure to contact your doctor if you have any problems.  Where can you learn more?  Go to https://www.PushCall.net/patientEd and enter F075 to learn more about \"A Healthy Heart: Care Instructions.\"  Current as of: June 24, 2023  Content Version: 14.1  © 6324-7451 [employer].   Care instructions adapted under license by [facility]

## 2024-08-22 NOTE — PROGRESS NOTES
tablet by mouth daily Yes Provider, Historical, MD       CareTeam (Including outside providers/suppliers regularly involved in providing care):   Patient Care Team:  Annamarie Salomon APRN - CNP as PCP - General (Nurse Practitioner)  Annamarie Salomon APRN - CNP as PCP - Empaneled Provider      Reviewed and updated this visit:  Tobacco  Allergies  Meds  Problems  Med Hx  Surg Hx  Soc Hx  Fam Hx

## 2024-09-11 DIAGNOSIS — M85.80 OSTEOPENIA, UNSPECIFIED LOCATION: ICD-10-CM

## 2024-09-11 RX ORDER — ALENDRONATE SODIUM 70 MG/1
TABLET ORAL
Qty: 12 TABLET | Refills: 1 | Status: SHIPPED | OUTPATIENT
Start: 2024-09-11

## 2025-01-06 DIAGNOSIS — F41.9 ANXIETY AND DEPRESSION: ICD-10-CM

## 2025-01-06 DIAGNOSIS — E03.9 HYPOTHYROIDISM, UNSPECIFIED TYPE: ICD-10-CM

## 2025-01-06 DIAGNOSIS — F32.A ANXIETY AND DEPRESSION: ICD-10-CM

## 2025-01-06 RX ORDER — LEVOTHYROXINE SODIUM 88 UG/1
88 TABLET ORAL DAILY
Qty: 90 TABLET | Refills: 1 | Status: SHIPPED | OUTPATIENT
Start: 2025-01-06

## 2025-01-06 NOTE — TELEPHONE ENCOUNTER
Bonny Sellers is calling to request a refill on the following medication(s):    Medication Request:  Requested Prescriptions     Pending Prescriptions Disp Refills    levothyroxine (SYNTHROID) 88 MCG tablet [Pharmacy Med Name: LEVOTHYROXINE 88 MCG TABLET] 90 tablet 1     Sig: TAKE 1 TABLET BY MOUTH EVERY DAY    sertraline (ZOLOFT) 50 MG tablet [Pharmacy Med Name: SERTRALINE HCL 50 MG TABLET] 90 tablet 1     Sig: TAKE 1 TABLET BY MOUTH EVERY DAY       Last Visit Date (If Applicable):  8/22/2024    Next Visit Date:    8/25/2025

## 2025-01-21 ENCOUNTER — OFFICE VISIT (OUTPATIENT)
Dept: FAMILY MEDICINE CLINIC | Age: 70
End: 2025-01-21

## 2025-01-21 VITALS
OXYGEN SATURATION: 99 % | DIASTOLIC BLOOD PRESSURE: 60 MMHG | BODY MASS INDEX: 28.48 KG/M2 | SYSTOLIC BLOOD PRESSURE: 130 MMHG | TEMPERATURE: 96.5 F | HEART RATE: 98 BPM | WEIGHT: 141 LBS

## 2025-01-21 DIAGNOSIS — J01.40 ACUTE NON-RECURRENT PANSINUSITIS: ICD-10-CM

## 2025-01-21 DIAGNOSIS — R05.9 COUGH IN ADULT: Primary | ICD-10-CM

## 2025-01-21 LAB
INFLUENZA A ANTIBODY: NORMAL
INFLUENZA B ANTIBODY: NORMAL
Lab: NORMAL
QC PASS/FAIL: NORMAL
SARS-COV-2 RDRP RESP QL NAA+PROBE: NEGATIVE

## 2025-01-21 RX ORDER — DOXYCYCLINE HYCLATE 100 MG
100 TABLET ORAL 2 TIMES DAILY
Qty: 14 TABLET | Refills: 0 | Status: SHIPPED | OUTPATIENT
Start: 2025-01-21 | End: 2025-01-28

## 2025-01-21 SDOH — ECONOMIC STABILITY: FOOD INSECURITY: WITHIN THE PAST 12 MONTHS, THE FOOD YOU BOUGHT JUST DIDN'T LAST AND YOU DIDN'T HAVE MONEY TO GET MORE.: NEVER TRUE

## 2025-01-21 SDOH — ECONOMIC STABILITY: FOOD INSECURITY: WITHIN THE PAST 12 MONTHS, YOU WORRIED THAT YOUR FOOD WOULD RUN OUT BEFORE YOU GOT MONEY TO BUY MORE.: NEVER TRUE

## 2025-01-21 ASSESSMENT — PATIENT HEALTH QUESTIONNAIRE - PHQ9
5. POOR APPETITE OR OVEREATING: NOT AT ALL
8. MOVING OR SPEAKING SO SLOWLY THAT OTHER PEOPLE COULD HAVE NOTICED. OR THE OPPOSITE, BEING SO FIGETY OR RESTLESS THAT YOU HAVE BEEN MOVING AROUND A LOT MORE THAN USUAL: NOT AT ALL
SUM OF ALL RESPONSES TO PHQ QUESTIONS 1-9: 4
6. FEELING BAD ABOUT YOURSELF - OR THAT YOU ARE A FAILURE OR HAVE LET YOURSELF OR YOUR FAMILY DOWN: NOT AT ALL
7. TROUBLE CONCENTRATING ON THINGS, SUCH AS READING THE NEWSPAPER OR WATCHING TELEVISION: NOT AT ALL
SUM OF ALL RESPONSES TO PHQ QUESTIONS 1-9: 4
SUM OF ALL RESPONSES TO PHQ9 QUESTIONS 1 & 2: 2
2. FEELING DOWN, DEPRESSED OR HOPELESS: SEVERAL DAYS
1. LITTLE INTEREST OR PLEASURE IN DOING THINGS: SEVERAL DAYS
4. FEELING TIRED OR HAVING LITTLE ENERGY: SEVERAL DAYS
SUM OF ALL RESPONSES TO PHQ QUESTIONS 1-9: 4
3. TROUBLE FALLING OR STAYING ASLEEP: SEVERAL DAYS
9. THOUGHTS THAT YOU WOULD BE BETTER OFF DEAD, OR OF HURTING YOURSELF: NOT AT ALL
SUM OF ALL RESPONSES TO PHQ QUESTIONS 1-9: 4
10. IF YOU CHECKED OFF ANY PROBLEMS, HOW DIFFICULT HAVE THESE PROBLEMS MADE IT FOR YOU TO DO YOUR WORK, TAKE CARE OF THINGS AT HOME, OR GET ALONG WITH OTHER PEOPLE: NOT DIFFICULT AT ALL

## 2025-01-21 ASSESSMENT — ENCOUNTER SYMPTOMS
COUGH: 1
SHORTNESS OF BREATH: 1
SORE THROAT: 0

## 2025-01-21 NOTE — PROGRESS NOTES
Annamarie Salomon, [facility]  [address]  [place]  [address]  [phone]  [phone]    Bonny ONEILL Verito is a 70 y.o. female who is here with c/o of:    Chief Complaint: Cough (Pt c/o productive cough with sinus and chest congestion x 2 weeks  OTC not helping per pt)      Patient Accompanied by: n/a    HPI - Bonny ONEILL Sellers is here today with c/o:    History of Present Illness  The patient is a 70-year-old female who presents with complaints of cough and chest congestion for 2 weeks.    Her symptoms began 2 weeks ago following a consultation with her endocrinologist. She experienced chills and fever that night, prompting her to seek warmth in a hot tub. She also reported body aches, shortness of breath, and chest congestion but did not experience any wheezing or sore throat. She described a sensation of fluid rushing from her head downwards. She has been producing yellow mucus when coughing. Additionally, she reported an irregular heartbeat. No one else in her household is ill. She reports a slight improvement in her condition. She initially took Advil for symptom relief but discontinued it due to sleep disturbances and nighttime coughing.    MEDICATIONS  Discontinued: Advil       There are no preventive care reminders to display for this patient.     Patient Active Problem List:     Osteopenia     FH: breast cancer in first degree relative     Hx of thyroid nodule     Hypothyroidism     Electronic cigarette use     Vaginal dryness, menopausal     Anxiety and depression     Primary osteoarthritis of right knee     Mixed hyperlipidemia     Sciatica of left side     Past Medical History:   Diagnosis Date    Anxiety     Arthritis     Depression     Fracture 1996    Hyperlipidemia     Hypothyroidism     Thyroid disease     Dr. Beham      Past Surgical History:   Procedure Laterality Date    ANKLE SURGERY      APPENDECTOMY      HYSTERECTOMY (CERVIX STATUS UNKNOWN)  1986

## 2025-02-13 ENCOUNTER — TELEPHONE (OUTPATIENT)
Dept: FAMILY MEDICINE CLINIC | Age: 70
End: 2025-02-13

## 2025-02-13 DIAGNOSIS — Z12.31 ENCOUNTER FOR SCREENING MAMMOGRAM FOR MALIGNANT NEOPLASM OF BREAST: Primary | ICD-10-CM

## 2025-02-13 NOTE — TELEPHONE ENCOUNTER
Patient called and stated a mammogram is scheduled for her and if you need to do a referral please put one in. I looked in epic and I do not see a recent order.

## 2025-03-10 ENCOUNTER — TELEPHONE (OUTPATIENT)
Dept: INFUSION THERAPY | Age: 70
End: 2025-03-10

## 2025-05-27 ENCOUNTER — HOSPITAL ENCOUNTER (OUTPATIENT)
Dept: MAMMOGRAPHY | Age: 70
Discharge: HOME | End: 2025-05-29
Payer: MEDICARE

## 2025-05-27 DIAGNOSIS — Z12.31 ENCOUNTER FOR SCREENING MAMMOGRAM FOR MALIGNANT NEOPLASM OF BREAST: ICD-10-CM

## 2025-05-27 PROCEDURE — 77063 BREAST TOMOSYNTHESIS BI: CPT

## 2025-05-28 ENCOUNTER — RESULTS FOLLOW-UP (OUTPATIENT)
Dept: FAMILY MEDICINE CLINIC | Age: 70
End: 2025-05-28

## 2025-07-15 ENCOUNTER — TELEPHONE (OUTPATIENT)
Dept: FAMILY MEDICINE CLINIC | Age: 70
End: 2025-07-15

## 2025-07-15 NOTE — TELEPHONE ENCOUNTER
Patient called stating she thinks she was bitten by a tick again. Patient stating she is having the reaction as last time. Advised patient that she would probably need to be seen as we have not seen for this since 06/2023. Patient stated she would call back. Advised patient if reaction got worse she should call back or go to UC

## 2025-08-07 DIAGNOSIS — M85.80 OSTEOPENIA, UNSPECIFIED LOCATION: ICD-10-CM

## 2025-08-07 RX ORDER — ALENDRONATE SODIUM 70 MG/1
70 TABLET ORAL WEEKLY
Qty: 12 TABLET | Refills: 1 | Status: SHIPPED | OUTPATIENT
Start: 2025-08-07

## 2025-08-25 ENCOUNTER — OFFICE VISIT (OUTPATIENT)
Dept: FAMILY MEDICINE CLINIC | Age: 70
End: 2025-08-25
Payer: MEDICARE

## 2025-08-25 ENCOUNTER — TELEPHONE (OUTPATIENT)
Dept: FAMILY MEDICINE CLINIC | Age: 70
End: 2025-08-25

## 2025-08-25 ENCOUNTER — HOSPITAL ENCOUNTER (OUTPATIENT)
Age: 70
Setting detail: SPECIMEN
Discharge: HOME | End: 2025-08-25

## 2025-08-25 VITALS
TEMPERATURE: 97.2 F | HEART RATE: 84 BPM | WEIGHT: 141 LBS | BODY MASS INDEX: 28.48 KG/M2 | OXYGEN SATURATION: 98 % | DIASTOLIC BLOOD PRESSURE: 80 MMHG | SYSTOLIC BLOOD PRESSURE: 120 MMHG

## 2025-08-25 DIAGNOSIS — E06.3 HASHIMOTO'S DISEASE: ICD-10-CM

## 2025-08-25 DIAGNOSIS — M85.80 OSTEOPENIA, UNSPECIFIED LOCATION: ICD-10-CM

## 2025-08-25 DIAGNOSIS — E78.2 MIXED HYPERLIPIDEMIA: ICD-10-CM

## 2025-08-25 DIAGNOSIS — Z00.00 MEDICARE ANNUAL WELLNESS VISIT, SUBSEQUENT: Primary | ICD-10-CM

## 2025-08-25 DIAGNOSIS — Z78.0 POST-MENOPAUSAL: ICD-10-CM

## 2025-08-25 LAB
ALBUMIN SERPL-MCNC: 4.4 G/DL (ref 3.5–5.2)
ALBUMIN/GLOB SERPL: 1.5 {RATIO} (ref 1–2.5)
ALP SERPL-CCNC: 54 U/L (ref 35–104)
ALT SERPL-CCNC: 13 U/L (ref 10–35)
ANION GAP SERPL CALCULATED.3IONS-SCNC: 11 MMOL/L (ref 9–16)
AST SERPL-CCNC: 21 U/L (ref 10–35)
BASOPHILS # BLD: 0.05 K/UL (ref 0–0.2)
BASOPHILS NFR BLD: 1 % (ref 0–2)
BILIRUB SERPL-MCNC: 0.8 MG/DL (ref 0–1.2)
BUN SERPL-MCNC: 12 MG/DL (ref 8–23)
CALCIUM SERPL-MCNC: 9.6 MG/DL (ref 8.6–10.4)
CHLORIDE SERPL-SCNC: 106 MMOL/L (ref 98–107)
CHOLEST SERPL-MCNC: 207 MG/DL (ref 0–199)
CHOLESTEROL/HDL RATIO: 2.8
CO2 SERPL-SCNC: 23 MMOL/L (ref 20–31)
CREAT SERPL-MCNC: 0.8 MG/DL (ref 0.6–0.9)
EOSINOPHIL # BLD: 0.05 K/UL (ref 0–0.44)
EOSINOPHILS RELATIVE PERCENT: 1 % (ref 1–4)
ERYTHROCYTE [DISTWIDTH] IN BLOOD BY AUTOMATED COUNT: 14 % (ref 11.8–14.4)
GFR, ESTIMATED: 80 ML/MIN/1.73M2
GLUCOSE SERPL-MCNC: 101 MG/DL (ref 74–99)
HCT VFR BLD AUTO: 45.8 % (ref 36.3–47.1)
HDLC SERPL-MCNC: 75 MG/DL
HGB BLD-MCNC: 14.6 G/DL (ref 11.9–15.1)
IMM GRANULOCYTES # BLD AUTO: <0.03 K/UL (ref 0–0.3)
IMM GRANULOCYTES NFR BLD: 0 %
LDLC SERPL CALC-MCNC: 104 MG/DL (ref 0–100)
LYMPHOCYTES NFR BLD: 3.18 K/UL (ref 1.1–3.7)
LYMPHOCYTES RELATIVE PERCENT: 45 % (ref 24–43)
MCH RBC QN AUTO: 29.6 PG (ref 25.2–33.5)
MCHC RBC AUTO-ENTMCNC: 31.9 G/DL (ref 28.4–34.8)
MCV RBC AUTO: 92.7 FL (ref 82.6–102.9)
MONOCYTES NFR BLD: 0.39 K/UL (ref 0.1–1.2)
MONOCYTES NFR BLD: 6 % (ref 3–12)
NEUTROPHILS NFR BLD: 47 % (ref 36–65)
NEUTS SEG NFR BLD: 3.44 K/UL (ref 1.5–8.1)
NRBC BLD-RTO: 0 PER 100 WBC
PLATELET # BLD AUTO: 238 K/UL (ref 138–453)
PMV BLD AUTO: 11.1 FL (ref 8.1–13.5)
POTASSIUM SERPL-SCNC: 4.5 MMOL/L (ref 3.7–5.3)
PROT SERPL-MCNC: 7.3 G/DL (ref 6.6–8.7)
RBC # BLD AUTO: 4.94 M/UL (ref 3.95–5.11)
SODIUM SERPL-SCNC: 140 MMOL/L (ref 136–145)
TRIGL SERPL-MCNC: 141 MG/DL (ref 0–149)
TSH SERPL DL<=0.05 MIU/L-ACNC: 0.64 UIU/ML (ref 0.27–4.2)
VLDLC SERPL CALC-MCNC: 28 MG/DL (ref 1–30)
WBC OTHER # BLD: 7.1 K/UL (ref 3.5–11.3)

## 2025-08-25 PROCEDURE — 1160F RVW MEDS BY RX/DR IN RCRD: CPT | Performed by: NURSE PRACTITIONER

## 2025-08-25 PROCEDURE — 1123F ACP DISCUSS/DSCN MKR DOCD: CPT | Performed by: NURSE PRACTITIONER

## 2025-08-25 PROCEDURE — 3017F COLORECTAL CA SCREEN DOC REV: CPT | Performed by: NURSE PRACTITIONER

## 2025-08-25 PROCEDURE — 99214 OFFICE O/P EST MOD 30 MIN: CPT | Performed by: NURSE PRACTITIONER

## 2025-08-25 PROCEDURE — 1159F MED LIST DOCD IN RCRD: CPT | Performed by: NURSE PRACTITIONER

## 2025-08-25 PROCEDURE — G8427 DOCREV CUR MEDS BY ELIG CLIN: HCPCS | Performed by: NURSE PRACTITIONER

## 2025-08-25 PROCEDURE — 1036F TOBACCO NON-USER: CPT | Performed by: NURSE PRACTITIONER

## 2025-08-25 PROCEDURE — G8399 PT W/DXA RESULTS DOCUMENT: HCPCS | Performed by: NURSE PRACTITIONER

## 2025-08-25 PROCEDURE — G8417 CALC BMI ABV UP PARAM F/U: HCPCS | Performed by: NURSE PRACTITIONER

## 2025-08-25 PROCEDURE — 1090F PRES/ABSN URINE INCON ASSESS: CPT | Performed by: NURSE PRACTITIONER

## 2025-08-25 PROCEDURE — G0439 PPPS, SUBSEQ VISIT: HCPCS | Performed by: NURSE PRACTITIONER

## 2025-08-25 ASSESSMENT — PATIENT HEALTH QUESTIONNAIRE - PHQ9
10. IF YOU CHECKED OFF ANY PROBLEMS, HOW DIFFICULT HAVE THESE PROBLEMS MADE IT FOR YOU TO DO YOUR WORK, TAKE CARE OF THINGS AT HOME, OR GET ALONG WITH OTHER PEOPLE: NOT DIFFICULT AT ALL
SUM OF ALL RESPONSES TO PHQ QUESTIONS 1-9: 0
4. FEELING TIRED OR HAVING LITTLE ENERGY: NOT AT ALL
5. POOR APPETITE OR OVEREATING: NOT AT ALL
9. THOUGHTS THAT YOU WOULD BE BETTER OFF DEAD, OR OF HURTING YOURSELF: NOT AT ALL
6. FEELING BAD ABOUT YOURSELF - OR THAT YOU ARE A FAILURE OR HAVE LET YOURSELF OR YOUR FAMILY DOWN: NOT AT ALL
2. FEELING DOWN, DEPRESSED OR HOPELESS: NOT AT ALL
7. TROUBLE CONCENTRATING ON THINGS, SUCH AS READING THE NEWSPAPER OR WATCHING TELEVISION: NOT AT ALL
SUM OF ALL RESPONSES TO PHQ QUESTIONS 1-9: 0
1. LITTLE INTEREST OR PLEASURE IN DOING THINGS: NOT AT ALL
SUM OF ALL RESPONSES TO PHQ QUESTIONS 1-9: 0
SUM OF ALL RESPONSES TO PHQ QUESTIONS 1-9: 0
3. TROUBLE FALLING OR STAYING ASLEEP: NOT AT ALL
8. MOVING OR SPEAKING SO SLOWLY THAT OTHER PEOPLE COULD HAVE NOTICED. OR THE OPPOSITE, BEING SO FIGETY OR RESTLESS THAT YOU HAVE BEEN MOVING AROUND A LOT MORE THAN USUAL: NOT AT ALL

## 2025-08-25 ASSESSMENT — LIFESTYLE VARIABLES
HOW MANY STANDARD DRINKS CONTAINING ALCOHOL DO YOU HAVE ON A TYPICAL DAY: PATIENT DOES NOT DRINK
HOW OFTEN DO YOU HAVE A DRINK CONTAINING ALCOHOL: NEVER